# Patient Record
Sex: FEMALE | Race: WHITE | NOT HISPANIC OR LATINO | Employment: UNEMPLOYED | URBAN - METROPOLITAN AREA
[De-identification: names, ages, dates, MRNs, and addresses within clinical notes are randomized per-mention and may not be internally consistent; named-entity substitution may affect disease eponyms.]

---
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
## 2019-09-23 ENCOUNTER — OFFICE VISIT (OUTPATIENT)
Dept: PEDIATRICS CLINIC | Facility: CLINIC | Age: 19
End: 2019-09-23

## 2019-09-23 VITALS
HEART RATE: 62 BPM | SYSTOLIC BLOOD PRESSURE: 110 MMHG | DIASTOLIC BLOOD PRESSURE: 70 MMHG | BODY MASS INDEX: 23.18 KG/M2 | TEMPERATURE: 98.1 F | WEIGHT: 122.8 LBS | RESPIRATION RATE: 14 BRPM | HEIGHT: 61 IN

## 2019-09-23 DIAGNOSIS — Z00.129 ENCOUNTER FOR WELL ADOLESCENT VISIT: Primary | ICD-10-CM

## 2019-09-23 DIAGNOSIS — Z13.31 SCREENING FOR DEPRESSION: ICD-10-CM

## 2019-09-23 DIAGNOSIS — L20.84 INTRINSIC ECZEMA: ICD-10-CM

## 2019-09-23 DIAGNOSIS — Z01.00 ENCOUNTER FOR VISION SCREENING: ICD-10-CM

## 2019-09-23 DIAGNOSIS — Z71.3 NUTRITIONAL COUNSELING: ICD-10-CM

## 2019-09-23 DIAGNOSIS — Z01.10 ENCOUNTER FOR HEARING EXAMINATION WITHOUT ABNORMAL FINDINGS: ICD-10-CM

## 2019-09-23 DIAGNOSIS — Z71.82 EXERCISE COUNSELING: ICD-10-CM

## 2019-09-23 PROBLEM — J45.30 MILD PERSISTENT ASTHMA WITHOUT COMPLICATION: Status: ACTIVE | Noted: 2019-09-23

## 2019-09-23 PROCEDURE — 96127 BRIEF EMOTIONAL/BEHAV ASSMT: CPT | Performed by: PEDIATRICS

## 2019-09-23 PROCEDURE — 92552 PURE TONE AUDIOMETRY AIR: CPT | Performed by: PEDIATRICS

## 2019-09-23 PROCEDURE — 99173 VISUAL ACUITY SCREEN: CPT | Performed by: PEDIATRICS

## 2019-09-23 PROCEDURE — 99395 PREV VISIT EST AGE 18-39: CPT | Performed by: PEDIATRICS

## 2019-09-23 RX ORDER — ALBUTEROL SULFATE 2.5 MG/3ML
2.5 SOLUTION RESPIRATORY (INHALATION) EVERY 6 HOURS PRN
COMMUNITY
End: 2020-11-17

## 2019-09-23 RX ORDER — ALBUTEROL SULFATE 90 UG/1
2 AEROSOL, METERED RESPIRATORY (INHALATION) EVERY 6 HOURS PRN
COMMUNITY
End: 2022-02-01 | Stop reason: SDUPTHER

## 2019-09-23 NOTE — PROGRESS NOTES
Subjective:     Zach Mireles is a 20 y o  female who is brought in for this well child visit  History provided by: patient and mother    Current Issues:  Current concerns: none  regular periods, no issues    The following portions of the patient's history were reviewed and updated as appropriate: allergies, current medications, past family history, past medical history, past social history, past surgical history and problem list     Well Child Assessment:  History provided by: patient  Santos Mariee lives with her mother  Nutrition  Types of intake include cow's milk and eggs (eats fast food, sometimes [employer] frozen meals, veggie mac n cheese, cups of fruit  , protein drinks to help with weight)  Dental  The patient has a dental home  The patient brushes teeth regularly  The patient flosses regularly  Last dental exam was less than 6 months ago  Behavioral  Disciplinary methods include consistency among caregivers  Sleep  Average sleep duration is 6 hours  The patient does not snore  There are no sleep problems  Safety  There is no smoking in the home  Home has working smoke alarms? yes  Home has working carbon monoxide alarms? yes  School  Current school district is Looking for a job right now, no college attendance      Screening  There are no risk factors for hearing loss  There are no risk factors for anemia  There are no risk factors for dyslipidemia  There are no risk factors for tuberculosis  There are no risk factors for vision problems  There are no risk factors related to diet  There are no risk factors at school  There are no risk factors for sexually transmitted infections  There are no risk factors related to alcohol  There are no risk factors related to relationships  There are no risk factors related to friends or family  There are no risk factors related to emotions  There are no risk factors related to drugs  There are no risk factors related to personal safety   There are no risk factors related to tobacco  There are no risk factors related to special circumstances  Social  The caregiver enjoys the child  After school, the child is at home with a parent  Sibling interactions are good  Objective:       Vitals:    09/23/19 1414   BP: 110/70   BP Location: Left arm   Patient Position: Sitting   Cuff Size: Adult   Pulse: 62   Resp: 14   Temp: 98 1 °F (36 7 °C)   TempSrc: Tympanic   Weight: 55 7 kg (122 lb 12 8 oz)   Height: 5' 1" (1 549 m)     Growth parameters are noted and are appropriate for age  Wt Readings from Last 1 Encounters:   09/23/19 55 7 kg (122 lb 12 8 oz)     Ht Readings from Last 1 Encounters:   09/23/19 5' 1" (1 549 m)      Body mass index is 23 2 kg/m²  Vitals:    09/23/19 1414   BP: 110/70   BP Location: Left arm   Patient Position: Sitting   Cuff Size: Adult   Pulse: 62   Resp: 14   Temp: 98 1 °F (36 7 °C)   TempSrc: Tympanic   Weight: 55 7 kg (122 lb 12 8 oz)   Height: 5' 1" (1 549 m)        Hearing Screening    125Hz 250Hz 500Hz 1000Hz 2000Hz 3000Hz 4000Hz 6000Hz 8000Hz   Right ear:    20 20  20     Left ear:    20 20  20        Visual Acuity Screening    Right eye Left eye Both eyes   Without correction:      With correction: 20/20 20/20 20/20       Physical Exam   Constitutional: She is oriented to person, place, and time  She appears well-developed and well-nourished  She is cooperative  HENT:   Head: Normocephalic  Right Ear: Hearing, tympanic membrane, external ear and ear canal normal    Left Ear: Hearing, tympanic membrane, external ear and ear canal normal    Nose: Nose normal    Mouth/Throat: Oropharynx is clear and moist and mucous membranes are normal    Eyes: Pupils are equal, round, and reactive to light  Conjunctivae, EOM and lids are normal    Neck: Trachea normal and normal range of motion  Neck supple  Cardiovascular: Normal rate, regular rhythm, normal heart sounds and normal pulses  No murmur heard    Pulmonary/Chest: Effort normal and breath sounds normal  She has no wheezes  She has no rhonchi  She has no rales  Abdominal: Soft  Bowel sounds are normal  She exhibits no mass  There is no hepatosplenomegaly  There is no tenderness  Genitourinary: Vagina normal    Genitourinary Comments: Normal Female  exam, андрей stage 5   Musculoskeletal: Normal range of motion  Neurological: She is alert and oriented to person, place, and time  She has normal strength  Skin: Skin is warm and dry  Capillary refill takes less than 2 seconds  Some dry patches of skin on her neck, and flexors of her arms and legs   Psychiatric: She has a normal mood and affect  Her behavior is normal  Judgment and thought content normal    Nursing note and vitals reviewed  Assessment:     Well adolescent  1  Encounter for well adolescent visit  HPV VACCINE 9 VALENT IM   2  Encounter for hearing examination without abnormal findings     3  Encounter for vision screening     4  Screening for depression          Plan:         1  Anticipatory guidance discussed  Specific topics reviewed: breast self-exam, drugs, ETOH, and tobacco, importance of regular dental care, importance of regular exercise, importance of varied diet, limit TV, media violence, minimize junk food, seat belts and sex; STD and pregnancy prevention  Nutrition and Exercise Counseling: The patient's Body mass index is 23 2 kg/m²  This is Facility age limit for growth percentiles is 20 years  Nutrition counseling provided:  Anticipatory guidance for nutrition given and counseled on healthy eating habits, 5 servings of fruits/vegetables and Avoid juice/sugary drinks    Exercise counseling provided:  Anticipatory guidance and counseling on exercise and physical activity given, Reduce screen time to less than 2 hours per day and 1 hour of aerobic exercise daily      2  Depression screen performed:       Patient screened- Negative    3  Development: appropriate for age    3  Immunizations today: vaccines up to date  5  Follow-up visit in 1 year for next well child visit, or sooner as needed

## 2019-09-23 NOTE — PATIENT INSTRUCTIONS

## 2020-07-30 ENCOUNTER — TELEPHONE (OUTPATIENT)
Dept: PEDIATRICS CLINIC | Facility: CLINIC | Age: 20
End: 2020-07-30

## 2020-07-30 DIAGNOSIS — J45.20 MILD INTERMITTENT ASTHMA WITHOUT COMPLICATION: Primary | ICD-10-CM

## 2020-07-30 RX ORDER — FLUTICASONE PROPIONATE 44 UG/1
2 AEROSOL, METERED RESPIRATORY (INHALATION) 2 TIMES DAILY
Qty: 1 INHALER | Refills: 2 | Status: SHIPPED | OUTPATIENT
Start: 2020-07-30 | End: 2022-02-01 | Stop reason: SDUPTHER

## 2020-07-30 RX ORDER — ALBUTEROL SULFATE 90 UG/1
2 AEROSOL, METERED RESPIRATORY (INHALATION) EVERY 4 HOURS PRN
Status: DISCONTINUED | OUTPATIENT
Start: 2020-07-30 | End: 2022-02-01

## 2020-07-30 NOTE — TELEPHONE ENCOUNTER
Laura Luh : 89 needs a refill of her 2 inhalers  Albuterol HFA 90 and Flovent  Send to [facility] on [address] in [facility]

## 2020-08-05 ENCOUNTER — TELEPHONE (OUTPATIENT)
Dept: PEDIATRICS CLINIC | Facility: CLINIC | Age: 20
End: 2020-08-05

## 2020-08-05 NOTE — TELEPHONE ENCOUNTER
Lashawn Morfin is missing her Pro Air/Albuterol inhaler  She said that was not called in  Thank you

## 2020-08-05 NOTE — TELEPHONE ENCOUNTER
Mahamed Sahu : 89  Mom called and I told her you will not be in the office until tomorrow  You only sent over 1 inhaler and she needed 2  Please send to [address] [place] [address] [facility]   Mom's phone #  [phone]

## 2020-08-06 NOTE — TELEPHONE ENCOUNTER
I don't see a prescription for albuterol/flovent in the chart  Last asthma check  last May 2019  I called mother but could not leave message  Because box was full

## 2020-08-17 NOTE — TELEPHONE ENCOUNTER
08/17/20 1:29 PM     Thank you for your request  Your request has been received and reviewed  Refill request sent thru in error  Appt scheduled with new  PCP office; new  PCP office to update PCP field when patient arrives for appointment  This message will now be completed      Thank you  Isaías Boone

## 2020-09-14 ENCOUNTER — OFFICE VISIT (OUTPATIENT)
Dept: FAMILY MEDICINE CLINIC | Facility: HOSPITAL | Age: 20
End: 2020-09-14

## 2020-09-14 VITALS
HEIGHT: 62 IN | TEMPERATURE: 98.3 F | BODY MASS INDEX: 25.4 KG/M2 | DIASTOLIC BLOOD PRESSURE: 62 MMHG | SYSTOLIC BLOOD PRESSURE: 120 MMHG | HEART RATE: 81 BPM | WEIGHT: 138 LBS

## 2020-09-14 DIAGNOSIS — L20.84 INTRINSIC ECZEMA: ICD-10-CM

## 2020-09-14 DIAGNOSIS — J45.30 MILD PERSISTENT ASTHMA WITHOUT COMPLICATION: Primary | ICD-10-CM

## 2020-09-14 PROCEDURE — 99213 OFFICE O/P EST LOW 20 MIN: CPT | Performed by: FAMILY MEDICINE

## 2020-09-14 NOTE — PROGRESS NOTES
Assessment/Plan:         Diagnoses and all orders for this visit:    Mild persistent asthma without complication  Comments:  OK PRN ProAir    Intrinsic eczema  Comments:  OK Hydrocortisone PRN    BMI 25 0-25 9,adult          Subjective:      Patient ID: Sushil Blood is a 23 y o  female  Establish as new patient to us  Cared for by Dr Nadja Starr to present time  Allergies can flare her asthma  Symptoms of allergies respond to Benadryl  Eczema under control with Hydrocortisone  The following portions of the patient's history were reviewed and updated as appropriate: allergies, current medications, past family history, past medical history, past social history, past surgical history and problem list     Review of Systems   Constitutional: Negative for unexpected weight change  HENT: Negative  Respiratory: Negative  Cardiovascular: Negative  Gastrointestinal: Negative  Genitourinary: Negative  Musculoskeletal: Negative  Allergic/Immunologic: Positive for environmental allergies  Neurological: Negative  Hematological: Negative  Psychiatric/Behavioral: Negative  All other systems reviewed and are negative  Objective:      /62   Pulse 81   Temp 98 3 °F (36 8 °C)   Ht 5' 2 25" (1 581 m)   Wt 62 6 kg (138 lb)   BMI 25 04 kg/m²          Physical Exam  Vitals signs and nursing note reviewed  Constitutional:       Appearance: Normal appearance  She is normal weight  HENT:      Head: Normocephalic and atraumatic  Right Ear: Tympanic membrane, ear canal and external ear normal       Left Ear: Tympanic membrane, ear canal and external ear normal       Nose: Nose normal       Mouth/Throat:      Mouth: Mucous membranes are moist    Eyes:      Extraocular Movements: Extraocular movements intact  Conjunctiva/sclera: Conjunctivae normal       Pupils: Pupils are equal, round, and reactive to light     Neck:      Musculoskeletal: Normal range of motion and neck supple  Cardiovascular:      Rate and Rhythm: Normal rate and regular rhythm  Pulses: Normal pulses  Heart sounds: Normal heart sounds  Pulmonary:      Effort: Pulmonary effort is normal       Breath sounds: Normal breath sounds  Abdominal:      General: Abdomen is flat  Bowel sounds are normal       Palpations: Abdomen is soft  Musculoskeletal: Normal range of motion  Lymphadenopathy:      Cervical: No cervical adenopathy  Skin:     Findings: Rash present  Comments: Lichenification antecubital areas both elbows   Neurological:      General: No focal deficit present  Mental Status: She is alert and oriented to person, place, and time  Psychiatric:         Mood and Affect: Mood normal          Behavior: Behavior normal          Thought Content: Thought content normal          Judgment: Judgment normal        BMI Counseling: Body mass index is 25 04 kg/m²  The BMI is above normal  Nutrition recommendations include reducing portion sizes, decreasing overall calorie intake and moderation in carbohydrate intake  Exercise recommendations include exercising 3-5 times per week

## 2020-11-17 ENCOUNTER — TELEPHONE (OUTPATIENT)
Dept: FAMILY MEDICINE CLINIC | Facility: HOSPITAL | Age: 20
End: 2020-11-17

## 2020-11-17 ENCOUNTER — TELEMEDICINE (OUTPATIENT)
Dept: FAMILY MEDICINE CLINIC | Facility: HOSPITAL | Age: 20
End: 2020-11-17
Payer: COMMERCIAL

## 2020-11-17 VITALS — WEIGHT: 140 LBS | TEMPERATURE: 99.6 F | BODY MASS INDEX: 25.76 KG/M2 | HEIGHT: 62 IN

## 2020-11-17 DIAGNOSIS — J02.9 PHARYNGITIS, UNSPECIFIED ETIOLOGY: Primary | ICD-10-CM

## 2020-11-17 PROCEDURE — 99213 OFFICE O/P EST LOW 20 MIN: CPT | Performed by: NURSE PRACTITIONER

## 2020-11-17 RX ORDER — AMOXICILLIN 400 MG/5ML
POWDER, FOR SUSPENSION ORAL
Qty: 200 ML | Refills: 0 | Status: SHIPPED | OUTPATIENT
Start: 2020-11-17 | End: 2020-11-23

## 2020-11-23 ENCOUNTER — TELEPHONE (OUTPATIENT)
Dept: FAMILY MEDICINE CLINIC | Facility: HOSPITAL | Age: 20
End: 2020-11-23

## 2020-11-30 ENCOUNTER — TELEPHONE (OUTPATIENT)
Dept: FAMILY MEDICINE CLINIC | Facility: HOSPITAL | Age: 20
End: 2020-11-30

## 2020-11-30 DIAGNOSIS — B96.89 BACTERIAL PHARYNGITIS: Primary | ICD-10-CM

## 2020-11-30 DIAGNOSIS — J02.8 BACTERIAL PHARYNGITIS: Primary | ICD-10-CM

## 2020-11-30 RX ORDER — CEFDINIR 250 MG/5ML
7 POWDER, FOR SUSPENSION ORAL 2 TIMES DAILY
Qty: 124.6 ML | Refills: 0 | Status: SHIPPED | OUTPATIENT
Start: 2020-11-30 | End: 2020-12-07

## 2020-12-01 ENCOUNTER — TELEPHONE (OUTPATIENT)
Dept: FAMILY MEDICINE CLINIC | Facility: HOSPITAL | Age: 20
End: 2020-12-01

## 2021-02-12 ENCOUNTER — TELEMEDICINE (OUTPATIENT)
Dept: FAMILY MEDICINE CLINIC | Facility: HOSPITAL | Age: 21
End: 2021-02-12
Payer: COMMERCIAL

## 2021-02-12 ENCOUNTER — APPOINTMENT (OUTPATIENT)
Dept: LAB | Facility: HOSPITAL | Age: 21
End: 2021-02-12

## 2021-02-12 VITALS — HEIGHT: 62 IN | BODY MASS INDEX: 25.76 KG/M2 | TEMPERATURE: 98.9 F | WEIGHT: 140 LBS

## 2021-02-12 DIAGNOSIS — J03.90 EXUDATIVE TONSILLITIS: ICD-10-CM

## 2021-02-12 DIAGNOSIS — J03.90 EXUDATIVE TONSILLITIS: Primary | ICD-10-CM

## 2021-02-12 LAB
ALBUMIN SERPL BCP-MCNC: 4.1 G/DL (ref 3.5–5)
ALP SERPL-CCNC: 94 U/L (ref 46–116)
ALT SERPL W P-5'-P-CCNC: 38 U/L (ref 12–78)
ANION GAP SERPL CALCULATED.3IONS-SCNC: 10 MMOL/L (ref 4–13)
AST SERPL W P-5'-P-CCNC: 26 U/L (ref 5–45)
BASOPHILS # BLD AUTO: 0.05 THOUSANDS/ΜL (ref 0–0.1)
BASOPHILS NFR BLD AUTO: 1 % (ref 0–1)
BILIRUB SERPL-MCNC: 0.3 MG/DL (ref 0.2–1)
BUN SERPL-MCNC: 6 MG/DL (ref 5–25)
CALCIUM SERPL-MCNC: 9 MG/DL (ref 8.3–10.1)
CHLORIDE SERPL-SCNC: 104 MMOL/L (ref 100–108)
CO2 SERPL-SCNC: 26 MMOL/L (ref 21–32)
CREAT SERPL-MCNC: 0.63 MG/DL (ref 0.6–1.3)
EOSINOPHIL # BLD AUTO: 0.3 THOUSAND/ΜL (ref 0–0.61)
EOSINOPHIL NFR BLD AUTO: 3 % (ref 0–6)
ERYTHROCYTE [DISTWIDTH] IN BLOOD BY AUTOMATED COUNT: 12.7 % (ref 11.6–15.1)
ERYTHROCYTE [SEDIMENTATION RATE] IN BLOOD: 33 MM/HOUR (ref 0–19)
GFR SERPL CREATININE-BSD FRML MDRD: 129 ML/MIN/1.73SQ M
GLUCOSE SERPL-MCNC: 83 MG/DL (ref 65–140)
HCT VFR BLD AUTO: 45.3 % (ref 34.8–46.1)
HETEROPH AB SER QL: NEGATIVE
HGB BLD-MCNC: 15 G/DL (ref 11.5–15.4)
IMM GRANULOCYTES # BLD AUTO: 0.06 THOUSAND/UL (ref 0–0.2)
IMM GRANULOCYTES NFR BLD AUTO: 1 % (ref 0–2)
LYMPHOCYTES # BLD AUTO: 2.19 THOUSANDS/ΜL (ref 0.6–4.47)
LYMPHOCYTES NFR BLD AUTO: 21 % (ref 14–44)
MCH RBC QN AUTO: 29.8 PG (ref 26.8–34.3)
MCHC RBC AUTO-ENTMCNC: 33.1 G/DL (ref 31.4–37.4)
MCV RBC AUTO: 90 FL (ref 82–98)
MONOCYTES # BLD AUTO: 0.88 THOUSAND/ΜL (ref 0.17–1.22)
MONOCYTES NFR BLD AUTO: 9 % (ref 4–12)
NEUTROPHILS # BLD AUTO: 6.91 THOUSANDS/ΜL (ref 1.85–7.62)
NEUTS SEG NFR BLD AUTO: 65 % (ref 43–75)
NRBC BLD AUTO-RTO: 0 /100 WBCS
PLATELET # BLD AUTO: 321 THOUSANDS/UL (ref 149–390)
PMV BLD AUTO: 9.7 FL (ref 8.9–12.7)
POTASSIUM SERPL-SCNC: 3.8 MMOL/L (ref 3.5–5.3)
PROT SERPL-MCNC: 8.3 G/DL (ref 6.4–8.2)
RBC # BLD AUTO: 5.03 MILLION/UL (ref 3.81–5.12)
SODIUM SERPL-SCNC: 140 MMOL/L (ref 136–145)
WBC # BLD AUTO: 10.39 THOUSAND/UL (ref 4.31–10.16)

## 2021-02-12 PROCEDURE — 36415 COLL VENOUS BLD VENIPUNCTURE: CPT

## 2021-02-12 PROCEDURE — 85652 RBC SED RATE AUTOMATED: CPT

## 2021-02-12 PROCEDURE — 85025 COMPLETE CBC W/AUTO DIFF WBC: CPT

## 2021-02-12 PROCEDURE — 86308 HETEROPHILE ANTIBODY SCREEN: CPT

## 2021-02-12 PROCEDURE — 80053 COMPREHEN METABOLIC PANEL: CPT

## 2021-02-12 PROCEDURE — 99213 OFFICE O/P EST LOW 20 MIN: CPT | Performed by: NURSE PRACTITIONER

## 2021-02-12 RX ORDER — AMOXICILLIN AND CLAVULANATE POTASSIUM 400; 57 MG/5ML; MG/5ML
POWDER, FOR SUSPENSION ORAL
Qty: 200 ML | Refills: 0 | Status: SHIPPED | OUTPATIENT
Start: 2021-02-12 | End: 2021-02-21

## 2021-02-12 NOTE — PROGRESS NOTES
COVID-19 Virtual Visit     Assessment/Plan:    Problem List Items Addressed This Visit     None      Visit Diagnoses     Exudative tonsillitis    -  Primary    Relevant Orders    CBC and differential (Completed)    Comprehensive metabolic panel (Completed)    Mononucleosis screen    Sedimentation rate, automated (Completed)         Disposition:     After clarifying the patient's history, my suspicion for COVID-19 infection is very low  Suspect mono vs strep as more likely cause for symptoms  STAT labs ordered to be completed today to r/o mono  Will determine further plan pending results  Rest, push fluids and diet as tolerated, and use OTC meds prn  I have spent 10 minutes directly with the patient  Greater than 50% of this time was spent in counseling/coordination of care regarding: risks and benefits of treatment options, instructions for management, patient and family education and impressions  Encounter provider DARIEL Dunbar    Provider located at [address] [place] [address]  [address] [place] [address]    Recent Visits  No visits were found meeting these conditions  Showing recent visits within past 7 days and meeting all other requirements     Today's Visits  Date Type Provider Dept   02/12/21 Telemedicine DARIEL Dunbar  [facility]   Showing today's visits and meeting all other requirements     Future Appointments  No visits were found meeting these conditions  Showing future appointments within next 150 days and meeting all other requirements      This virtual check-in was done via [employer] and patient was informed that this is not a secure, HIPAA-compliant platform  She agrees to proceed  Patient agrees to participate in a virtual check in via telephone or video visit instead of presenting to the office to address urgent/immediate medical needs  Patient is aware this is a billable service      After connecting through Tele[employer], the patient was identified by name and date of birth  Anthony Rosas was informed that this was a telemedicine visit and that the exam was being conducted confidentially over secure lines  My office door was closed  No one else was in the room  Anthony Rosas acknowledged consent and understanding of privacy and security of the telemedicine visit  I informed the patient that I have reviewed her record in Epic and presented the opportunity for her to ask any questions regarding the visit today  The patient agreed to participate  Subjective:   Anthony Rosas is a 23 y o  female who is concerned about COVID-19  Patient's symptoms include fatigue (for over a week per mom), nasal congestion, sore throat (worse yesterday, feels swollen, has white spots), nausea, diarrhea (5 nights ago) and headache (this week)  Patient denies fever, chills, rhinorrhea, anosmia, loss of taste, cough, shortness of breath, chest tightness, abdominal pain and vomiting  Date of symptom onset: 2/8/2021    Exposure:   Contact with a person who is under investigation (PUI) for or who is positive for COVID-19 within the last 14 days?: No    Hospitalized recently for fever and/or lower respiratory symptoms?: No      Currently a healthcare worker that is involved in direct patient care?: No      Works in a special setting where the risk of COVID-19 transmission may be high? (this may include long-term care, correctional and penitentiary facilities; homeless shelters; assisted-living facilities and group homes ): No      Resident in a special setting where the risk of COVID-19 transmission may be high? (this may include long-term care, correctional and penitentiary facilities; homeless shelters; assisted-living facilities and group homes ): No      Started w/upset stomach, felt tired and had headache 5 days ago  No meds taken  Has been out of work since Tuesday  Denies known covid or mono contacts   Mom states she was recently on 2 rounds of antibiotics for strep but still has sore throat and white spots  No results found for: Jalen Johns, SARSCORONAVI, [address]  Past Medical History:   Diagnosis Date    Allergic rhinitis     Asthma     Eczema      History reviewed  No pertinent surgical history  Current Outpatient Medications   Medication Sig Dispense Refill    albuterol (PROVENTIL HFA,VENTOLIN HFA) 90 mcg/act inhaler Inhale 2 puffs every 6 (six) hours as needed for wheezing      fluticasone (Flovent HFA) 44 mcg/act inhaler Inhale 2 puffs 2 (two) times a day Rinse mouth after use  1 Inhaler 2     Current Facility-Administered Medications   Medication Dose Route Frequency Provider Last Rate Last Admin    albuterol (PROVENTIL HFA,VENTOLIN HFA) inhaler 2 puff  2 puff Inhalation Q4H PRN Kemi Jeffrey MD         No Known Allergies    Review of Systems   Constitutional: Positive for appetite change (last week eating/drinking less, better this week) and fatigue (for over a week per mom)  Negative for chills and fever  HENT: Positive for congestion and sore throat (worse yesterday, feels swollen, has white spots)  Negative for rhinorrhea  Respiratory: Negative for cough, chest tightness and shortness of breath  Gastrointestinal: Positive for diarrhea (5 nights ago) and nausea  Negative for abdominal pain and vomiting  Neurological: Positive for headaches (this week)  Psychiatric/Behavioral: The patient is nervous/anxious (pt states stress has been bothering her)  Objective:    Vitals:    02/12/21 0949   Temp: 98 9 °F (37 2 °C)   Weight: 63 5 kg (140 lb)   Height: 5' 2 25" (1 581 m)       Physical Exam  Vitals signs reviewed  Constitutional:       General: She is not in acute distress  Appearance: She is not ill-appearing  HENT:      Head: Normocephalic and atraumatic  Mouth/Throat: Tonsils: Tonsillar exudate present  1+ on the right  1+ on the left     Pulmonary:      Effort: Pulmonary effort is normal  No respiratory distress  Comments: No cough  Neurological:      General: No focal deficit present  Mental Status: She is alert and oriented to person, place, and time  Psychiatric:         Mood and Affect: Mood normal          Behavior: Behavior normal        VIRTUAL VISIT DISCLAIMER    Alea Benson acknowledges that she has consented to an online visit or consultation  She understands that the online visit is based solely on information provided by her, and that, in the absence of a face-to-face physical evaluation by the physician, the diagnosis she receives is both limited and provisional in terms of accuracy and completeness  This is not intended to replace a full medical face-to-face evaluation by the physician  Alea Benson understands and accepts these terms

## 2021-09-21 ENCOUNTER — TELEMEDICINE (OUTPATIENT)
Dept: FAMILY MEDICINE CLINIC | Facility: HOSPITAL | Age: 21
End: 2021-09-21
Payer: COMMERCIAL

## 2021-09-21 ENCOUNTER — TELEPHONE (OUTPATIENT)
Dept: FAMILY MEDICINE CLINIC | Facility: HOSPITAL | Age: 21
End: 2021-09-21

## 2021-09-21 VITALS — TEMPERATURE: 99.4 F | BODY MASS INDEX: 25.76 KG/M2 | HEIGHT: 62 IN | WEIGHT: 140 LBS

## 2021-09-21 DIAGNOSIS — J02.9 PHARYNGITIS, UNSPECIFIED ETIOLOGY: Primary | ICD-10-CM

## 2021-09-21 LAB — S PYO AG THROAT QL: NEGATIVE

## 2021-09-21 PROCEDURE — 87880 STREP A ASSAY W/OPTIC: CPT | Performed by: NURSE PRACTITIONER

## 2021-09-21 PROCEDURE — 99213 OFFICE O/P EST LOW 20 MIN: CPT | Performed by: NURSE PRACTITIONER

## 2021-09-21 NOTE — PROGRESS NOTES
Virtual Regular Visit    Verification of patient location:    Patient is located in the following state in which I hold an active license [place]      Assessment/Plan:    Problem List Items Addressed This Visit     None      Visit Diagnoses     Pharyngitis, unspecified etiology    -  Primary    Exam was normal  I suspect early viral URI  Low suspicion for COVID and day 2 of symtpoms so defer swab  Symptomatic support  Send throat culture  Relevant Orders    Throat culture    POCT rapid strepA               Reason for visit is   Chief Complaint   Patient presents with    Sore Throat    Nasal Congestion    Virtual Regular Visit        Encounter provider Mayuri Cuevas     Provider located at [address]  [facility] [address]      Recent Visits  No visits were found meeting these conditions  Showing recent visits within past 7 days and meeting all other requirements  Today's Visits  Date Type Provider Dept   09/21/21 Telemedicine DARIEL Cuevas [facility] [address]   Showing today's visits and meeting all other requirements  Future Appointments  No visits were found meeting these conditions  Showing future appointments within next 150 days and meeting all other requirements       The patient was identified by name and date of birth  Jaron Ospina was informed that this is a telemedicine visit and that the visit is being conducted through [address] [place] [address] and patient was informed that this is a secure, HIPAA-compliant platform  She agrees to proceed     My office door was closed  No one else was in the room  She acknowledged consent and understanding of privacy and security of the video platform  The patient has agreed to participate and understands they can discontinue the visit at any time  Patient is aware this is a billable service       Subjective  Jaron Ospina is a 24 y o  female Rut Sanches woke with sore throat  Tonsils are red with white spots  First on right side and now on left side  No fever or chills  Has mild nasal congestion which just started  No N/V/D  No HA or body aches  Has taste and smell  No cough, sob or chest tightness  No vaccinated against COVID  No COVID contacts  Sore Throat   Associated symptoms include congestion  Pertinent negatives include no abdominal pain, coughing, diarrhea, headaches, shortness of breath or vomiting  Past Medical History:   Diagnosis Date    Allergic rhinitis     Asthma     Eczema        History reviewed  No pertinent surgical history  Current Outpatient Medications   Medication Sig Dispense Refill    albuterol (PROVENTIL HFA,VENTOLIN HFA) 90 mcg/act inhaler Inhale 2 puffs every 6 (six) hours as needed for wheezing      fluticasone (Flovent HFA) 44 mcg/act inhaler Inhale 2 puffs 2 (two) times a day Rinse mouth after use  1 Inhaler 2     Current Facility-Administered Medications   Medication Dose Route Frequency Provider Last Rate Last Admin    albuterol (PROVENTIL HFA,VENTOLIN HFA) inhaler 2 puff  2 puff Inhalation Q4H PRN Miguel Rawls MD            No Known Allergies    Review of Systems   Constitutional: Negative for chills, fatigue and fever  HENT: Positive for congestion and sore throat  Respiratory: Negative for cough, chest tightness and shortness of breath  Gastrointestinal: Negative for abdominal pain, diarrhea, nausea and vomiting  Musculoskeletal: Negative for myalgias  Neurological: Negative for headaches  Video Exam    Vitals:    09/21/21 1417   Temp: 99 4 °F (37 4 °C)   Weight: 63 5 kg (140 lb)   Height: 5' 2" (1 575 m)       Physical Exam  Vitals reviewed  Constitutional:       General: She is not in acute distress  Appearance: She is well-developed  She is not ill-appearing  HENT:      Mouth/Throat:      Mouth: Mucous membranes are moist       Pharynx: Oropharynx is clear   No oropharyngeal exudate or posterior oropharyngeal erythema  Tonsils: No tonsillar exudate  Skin:     General: Skin is warm and dry  Neurological:      Mental Status: She is alert and oriented to person, place, and time  Psychiatric:         Mood and Affect: Mood normal          Behavior: Behavior normal       Exam and throat swab was done via curbside  I spent 15 minutes directly with the patient during this visit    VIRTUAL VISIT [id number] verbally agrees to participate in [employer]  Pt is aware that [employer] could be limited without vital signs or the ability to perform a full hands-on physical exam  Estrella Cruz understands she or the provider may request at any time to terminate the video visit and request the patient to seek care or treatment in person

## 2021-09-21 NOTE — LETTER
September 21, 2021     Patient: Libia Gmóez   YOB: 2000   Date of Visit: 9/21/2021       To Whom it May Concern:    Delfinaalisia Emily is under my professional care  She was seen in my office on 9/21/2021  She may return to work on 9/22/21  If you have any questions or concerns, please don't hesitate to call           Sincerely,          DARIEL Rutledge        CC: No Recipients

## 2021-09-21 NOTE — TELEPHONE ENCOUNTER
Has strep symptoms, white spots, red tonsils sore throat, prone to strep - not sure if she can come in ----?virtual or in person  Call her please with appt

## 2021-09-23 LAB — B-HEM STREP SPEC QL CULT: NEGATIVE

## 2022-02-01 ENCOUNTER — OFFICE VISIT (OUTPATIENT)
Dept: FAMILY MEDICINE CLINIC | Facility: HOSPITAL | Age: 22
End: 2022-02-01

## 2022-02-01 VITALS
WEIGHT: 140.2 LBS | HEIGHT: 62 IN | HEART RATE: 91 BPM | DIASTOLIC BLOOD PRESSURE: 70 MMHG | OXYGEN SATURATION: 99 % | TEMPERATURE: 98.4 F | SYSTOLIC BLOOD PRESSURE: 120 MMHG | BODY MASS INDEX: 25.8 KG/M2

## 2022-02-01 DIAGNOSIS — Z00.00 ANNUAL PHYSICAL EXAM: Primary | ICD-10-CM

## 2022-02-01 DIAGNOSIS — Z97.3 WEARS GLASSES: ICD-10-CM

## 2022-02-01 DIAGNOSIS — J45.20 MILD INTERMITTENT ASTHMA WITHOUT COMPLICATION: ICD-10-CM

## 2022-02-01 DIAGNOSIS — Z02.4 ENCOUNTER FOR EXAMINATION FOR DRIVING LICENSE: ICD-10-CM

## 2022-02-01 DIAGNOSIS — L20.84 INTRINSIC ECZEMA: ICD-10-CM

## 2022-02-01 PROCEDURE — 99395 PREV VISIT EST AGE 18-39: CPT | Performed by: FAMILY MEDICINE

## 2022-02-01 RX ORDER — FLUTICASONE PROPIONATE 44 MCG
2 AEROSOL WITH ADAPTER (GRAM) INHALATION 2 TIMES DAILY
Qty: 10.6 G | Refills: 2 | Status: SHIPPED | OUTPATIENT
Start: 2022-02-01 | End: 2023-02-01

## 2022-02-01 RX ORDER — ALBUTEROL SULFATE 90 UG/1
2 AEROSOL, METERED RESPIRATORY (INHALATION) EVERY 6 HOURS PRN
Qty: 18 G | Refills: 2 | Status: SHIPPED | OUTPATIENT
Start: 2022-02-01

## 2022-02-01 NOTE — PROGRESS NOTES
ADULT ANNUAL [address] [facility] [address]     NAME: Cathy Whitaker  AGE: 24 y o  SEX: female  : 2000     DATE: 2022     Assessment and Plan:     Problem List Items Addressed This Visit        Respiratory    Mild intermittent asthma without complication    Relevant Medications    fluticasone (Flovent HFA) 44 mcg/act inhaler    albuterol (PROVENTIL HFA,VENTOLIN HFA) 90 mcg/act inhaler       Musculoskeletal and Integument    Intrinsic eczema       Other    BMI 25 0-25 9,adult    Annual physical exam - Primary    Encounter for examination for driving license    Wears glasses          Immunizations and preventive care screenings were discussed with patient today  Appropriate education was printed on patient's after visit summary  Counseling:  Alcohol/drug use: discussed moderation in alcohol intake, the recommendations for healthy alcohol use, and avoidance of illicit drug use  Dental Health: discussed importance of regular tooth brushing, flossing, and dental visits  Injury prevention: discussed safety/seat belts, safety helmets, smoke detectors, carbon dioxide detectors, and smoking near bedding or upholstery  · Exercise: the importance of regular exercise/physical activity was discussed  Recommend exercise 3-5 times per week for at least 30 minutes  No follow-ups on file  Chief Complaint:     Chief Complaint   Patient presents with    Physical Exam      History of Present Illness:     Adult Annual Physical   Patient here for a comprehensive physical exam  The patient reports no problems  Diet and Physical Activity  · Diet/Nutrition: well balanced diet  · Exercise: walking        Depression Screening  PHQ-2/9 Depression Screening    Little interest or pleasure in doing things: 0 - not at all  Feeling down, depressed, or hopeless: 0 - not at all  PHQ-2 Score: 0  PHQ-2 Interpretation: Negative depression screen General Health  · Sleep: sleeps well  · Hearing: normal - bilateral   · Vision: wears glasses  · Dental: regular dental visits and brushes teeth twice daily  /GYN Health  · Last menstrual period: -  · Contraceptive method: -   · History of STDs?: no      Review of Systems:     Review of Systems   Past Medical History:     Past Medical History:   Diagnosis Date    Allergic rhinitis     Asthma     Eczema       Past Surgical History:     History reviewed  No pertinent surgical history  Social History:     Social History     Socioeconomic History    Marital status: Single     Spouse name: None    Number of children: None    Years of education: None    Highest education level: None   Occupational History    None   Tobacco Use    Smoking status: Never Smoker    Smokeless tobacco: Never Used   Vaping Use    Vaping Use: Never used   Substance and Sexual Activity    Alcohol use: No    Drug use: No    Sexual activity: None   Other Topics Concern    None   Social History Narrative    None     Social Determinants of Health     Financial Resource Strain: Not on file   Food Insecurity: Not on file   Transportation Needs: Not on file   Physical Activity: Not on file   Stress: Not on file   Social Connections: Not on file   Intimate Partner Violence: Not on file   Housing Stability: Not on file      Family History:     Family History   Problem Relation Age of Onset    Cancer Paternal Grandfather       Current Medications:     Current Outpatient Medications   Medication Sig Dispense Refill    albuterol (PROVENTIL HFA,VENTOLIN HFA) 90 mcg/act inhaler Inhale 2 puffs every 6 (six) hours as needed for wheezing 18 g 2    fluticasone (Flovent HFA) 44 mcg/act inhaler Inhale 2 puffs 2 (two) times a day Rinse mouth after use  10 6 g 2     No current facility-administered medications for this visit        Allergies:     No Known Allergies   Physical Exam:     /70 (BP Location: Left arm, Patient Position: Sitting, Cuff Size: Standard)   Pulse 91   Temp 98 4 °F (36 9 °C) (Tympanic)   Ht 5' 2" (1 575 m)   Wt 63 6 kg (140 lb 3 2 oz)   SpO2 99%   BMI 25 64 kg/m²     Physical Exam  Vitals and nursing note reviewed  Constitutional:       Appearance: Normal appearance  HENT:      Head: Normocephalic and atraumatic  Right Ear: Tympanic membrane, ear canal and external ear normal       Left Ear: Tympanic membrane, ear canal and external ear normal       Nose: Nose normal       Mouth/Throat:      Mouth: Mucous membranes are moist    Eyes:      Extraocular Movements: Extraocular movements intact  Pupils: Pupils are equal, round, and reactive to light  Neck:      Vascular: No carotid bruit  Cardiovascular:      Rate and Rhythm: Normal rate and regular rhythm  Pulses: Normal pulses  Heart sounds: Normal heart sounds  Pulmonary:      Effort: Pulmonary effort is normal       Breath sounds: Normal breath sounds  Abdominal:      General: Abdomen is flat  Bowel sounds are normal       Palpations: Abdomen is soft  Musculoskeletal:      Right lower leg: No edema  Left lower leg: No edema  Lymphadenopathy:      Cervical: No cervical adenopathy  Skin:     Findings: Rash present  Neurological:      General: No focal deficit present  Mental Status: She is alert and oriented to person, place, and time     Psychiatric:         Mood and Affect: Mood normal           Anand Barahona MD   [id number]

## 2022-02-01 NOTE — PATIENT INSTRUCTIONS

## 2022-04-22 ENCOUNTER — OFFICE VISIT (OUTPATIENT)
Dept: FAMILY MEDICINE CLINIC | Facility: HOSPITAL | Age: 22
End: 2022-04-22

## 2022-04-22 VITALS
WEIGHT: 137.6 LBS | TEMPERATURE: 98.5 F | OXYGEN SATURATION: 100 % | SYSTOLIC BLOOD PRESSURE: 120 MMHG | HEART RATE: 90 BPM | BODY MASS INDEX: 25.32 KG/M2 | DIASTOLIC BLOOD PRESSURE: 70 MMHG | HEIGHT: 62 IN

## 2022-04-22 DIAGNOSIS — F41.1 GENERALIZED ANXIETY DISORDER: ICD-10-CM

## 2022-04-22 DIAGNOSIS — J45.30 MILD PERSISTENT ASTHMA WITHOUT COMPLICATION: Primary | ICD-10-CM

## 2022-04-22 PROCEDURE — 99213 OFFICE O/P EST LOW 20 MIN: CPT | Performed by: FAMILY MEDICINE

## 2022-04-22 RX ORDER — ESCITALOPRAM OXALATE 5 MG/1
5 TABLET ORAL DAILY
Qty: 30 TABLET | Refills: 2 | Status: SHIPPED | OUTPATIENT
Start: 2022-04-22 | End: 2022-07-12 | Stop reason: SINTOL

## 2022-04-22 NOTE — PROGRESS NOTES
Assessment/Plan:         Diagnoses and all orders for this visit:    Mild persistent asthma without complication    Generalized anxiety disorder  -     escitalopram (LEXAPRO) 5 mg tablet; Take 1 tablet (5 mg total) by mouth daily          Subjective:      Patient ID: Zurdo Jimenez is a 24 y o  female  Visit for asthma and anxiety    Started wheezing early yesterday and needed to use inhaler, helped after a few inhalations  We reviewed the purpose and use of her two inhalers- use Flovent daily for the next month and use Albuterol PRN rescue need    Struggling with anxiety- had therapy a few years ago  Never used medications  Has episodes of heart pounding and anxiety flares  Has some relaxation techniques she can apply    She is ready and interested in starting a medication  Advised daily med rather than PRN because of unpredictability of her anxiety      The following portions of the patient's history were reviewed and updated as appropriate: allergies, current medications, past family history, past medical history, past social history, past surgical history and problem list     Review of Systems   Constitutional: Negative for unexpected weight change  Respiratory: Positive for cough, shortness of breath and wheezing  Psychiatric/Behavioral: Negative for agitation, behavioral problems and decreased concentration  The patient is nervous/anxious  All other systems reviewed and are negative  Objective:      /70 (BP Location: Left arm, Patient Position: Sitting, Cuff Size: Standard)   Pulse 90   Temp 98 5 °F (36 9 °C) (Tympanic)   Ht 5' 2" (1 575 m)   Wt 62 4 kg (137 lb 9 6 oz)   SpO2 100%   BMI 25 17 kg/m²          Physical Exam  Vitals and nursing note reviewed  Cardiovascular:      Rate and Rhythm: Normal rate and regular rhythm  Pulses: Normal pulses  Heart sounds: Normal heart sounds     Pulmonary:      Effort: Pulmonary effort is normal       Breath sounds: Normal breath sounds  Neurological:      General: No focal deficit present  Mental Status: She is alert and oriented to person, place, and time     Psychiatric:         Mood and Affect: Mood normal

## 2022-07-06 ENCOUNTER — TELEPHONE (OUTPATIENT)
Dept: FAMILY MEDICINE CLINIC | Facility: HOSPITAL | Age: 22
End: 2022-07-06

## 2022-07-06 NOTE — TELEPHONE ENCOUNTER
It is possible that it is from Lexapro  Have her reduce to 1 tablet every other day for one week, then stop it  I wouldn't start a new medication until off of Lexapro for 2 weeks or so  Does she want to make an appt?

## 2022-07-06 NOTE — TELEPHONE ENCOUNTER
Pt states at last visit she was put on Lexapro, for about a month she has been having manic episodes, and last week had feelings of hurting herself  States she does not currently have these feelings, but would like to discuss this with someone as fas as possibly being the medications that is causing it  Only had this though once, but feels it is due to the Lexapro   PCB

## 2022-07-12 ENCOUNTER — OFFICE VISIT (OUTPATIENT)
Dept: FAMILY MEDICINE CLINIC | Facility: HOSPITAL | Age: 22
End: 2022-07-12
Payer: COMMERCIAL

## 2022-07-12 VITALS
TEMPERATURE: 98.5 F | DIASTOLIC BLOOD PRESSURE: 63 MMHG | OXYGEN SATURATION: 99 % | HEIGHT: 62 IN | HEART RATE: 79 BPM | WEIGHT: 139.4 LBS | SYSTOLIC BLOOD PRESSURE: 109 MMHG | BODY MASS INDEX: 25.65 KG/M2

## 2022-07-12 DIAGNOSIS — J45.30 MILD PERSISTENT ASTHMA WITHOUT COMPLICATION: ICD-10-CM

## 2022-07-12 DIAGNOSIS — F39 MOOD DISORDER (HCC): Primary | ICD-10-CM

## 2022-07-12 PROCEDURE — 99213 OFFICE O/P EST LOW 20 MIN: CPT | Performed by: FAMILY MEDICINE

## 2022-07-12 RX ORDER — MIRTAZAPINE 7.5 MG/1
7.5 TABLET, FILM COATED ORAL
Qty: 30 TABLET | Refills: 5 | Status: SHIPPED | OUTPATIENT
Start: 2022-07-12

## 2022-07-12 NOTE — PROGRESS NOTES
Assessment/Plan:         Diagnoses and all orders for this visit:    Mood disorder ([facility] [address] )  -     mirtazapine (REMERON) 7 5 MG tablet; Take 1 tablet (7 5 mg total) by mouth daily at bedtime    Mild persistent asthma without complication          Subjective:      Patient ID: Fannie Muñiz is a 24 y o  female  Discussion on anxiety medication    Started Escitalopram last visit    Asthma is quiescent, not needing at this point    Medication caused some fatigue initially and evened out her anxiety but then caused some manic- like overactivity and overtalkative  Had one day of thoughts of hurting herself  Lasted just one day  Was taking it every other day per our instructions to discontinue  No palpitations    Has history of mood swings and right now isnt as anxious      The following portions of the patient's history were reviewed and updated as appropriate: allergies, current medications, past family history, past medical history, past social history, past surgical history and problem list     Review of Systems   Constitutional: Negative for unexpected weight change  Genitourinary: Negative  Hematological: Negative  Psychiatric/Behavioral: Positive for decreased concentration and dysphoric mood  The patient is nervous/anxious  All other systems reviewed and are negative  Objective:      /63 (BP Location: Left arm, Patient Position: Sitting, Cuff Size: Standard)   Pulse 79   Temp 98 5 °F (36 9 °C) (Tympanic)   Ht 5' 2" (1 575 m)   Wt 63 2 kg (139 lb 6 4 oz)   SpO2 99%   BMI 25 50 kg/m²          Physical Exam  Vitals reviewed  Cardiovascular:      Rate and Rhythm: Normal rate and regular rhythm  Pulses: Normal pulses  Heart sounds: Normal heart sounds  Neurological:      General: No focal deficit present  Mental Status: She is oriented to person, place, and time     Psychiatric:         Mood and Affect: Mood normal          Behavior: Behavior normal  Thought Content:  Thought content normal

## 2022-08-16 ENCOUNTER — OFFICE VISIT (OUTPATIENT)
Dept: FAMILY MEDICINE CLINIC | Facility: HOSPITAL | Age: 22
End: 2022-08-16
Payer: COMMERCIAL

## 2022-08-16 VITALS
OXYGEN SATURATION: 100 % | SYSTOLIC BLOOD PRESSURE: 110 MMHG | DIASTOLIC BLOOD PRESSURE: 70 MMHG | HEIGHT: 62 IN | WEIGHT: 140 LBS | TEMPERATURE: 97.9 F | BODY MASS INDEX: 25.76 KG/M2 | HEART RATE: 89 BPM

## 2022-08-16 DIAGNOSIS — F39 MOOD DISORDER (HCC): Primary | ICD-10-CM

## 2022-08-16 PROCEDURE — 99213 OFFICE O/P EST LOW 20 MIN: CPT | Performed by: FAMILY MEDICINE

## 2022-08-16 NOTE — PROGRESS NOTES
Assessment/Plan:         Diagnoses and all orders for this visit:    Mood disorder ([facility] [address] )  Comments:  Good start with Mirtazapine, OK to increase to 2 hs premenstrually for 4-5 days          Subjective:      Patient ID: Gladys Sorto is a 20 y o  female  1 month follow up  Taking Mirtazapine for past month as switch from Escitalopram which caused some manic-like thoughts and behavior  Tolerating med adequately  Not as manic, more happy overall  Had a sad week last week, no real reason    Sleeping well, better    Work in general is OK    No side effects    Does have a counselor and would be willing to restart       The following portions of the patient's history were reviewed and updated as appropriate: allergies, current medications, past family history, past medical history, past social history, past surgical history and problem list     Review of Systems   Constitutional: Negative for unexpected weight change  Respiratory: Negative  Cardiovascular: Negative  Psychiatric/Behavioral: Positive for dysphoric mood  The patient is nervous/anxious  All other systems reviewed and are negative  Objective:      /70 (BP Location: Left arm, Patient Position: Sitting, Cuff Size: Standard)   Pulse 89   Temp 97 9 °F (36 6 °C) (Tympanic)   Ht 5' 2" (1 575 m)   Wt 63 5 kg (140 lb)   SpO2 100%   BMI 25 61 kg/m²          Physical Exam  Psychiatric:         Mood and Affect: Mood normal          Behavior: Behavior normal          Thought Content:  Thought content normal          Judgment: Judgment normal

## 2022-08-29 ENCOUNTER — TELEPHONE (OUTPATIENT)
Dept: FAMILY MEDICINE CLINIC | Facility: HOSPITAL | Age: 22
End: 2022-08-29

## 2022-08-29 DIAGNOSIS — J45.20 MILD INTERMITTENT ASTHMA WITHOUT COMPLICATION: ICD-10-CM

## 2022-08-29 RX ORDER — ALBUTEROL SULFATE 90 UG/1
2 AEROSOL, METERED RESPIRATORY (INHALATION) EVERY 6 HOURS PRN
Qty: 18 G | Refills: 2 | Status: SHIPPED | OUTPATIENT
Start: 2022-08-29

## 2022-08-29 RX ORDER — ALBUTEROL SULFATE 90 UG/1
2 AEROSOL, METERED RESPIRATORY (INHALATION) EVERY 6 HOURS PRN
Qty: 18 G | Refills: 2 | Status: CANCELLED | OUTPATIENT
Start: 2022-08-29

## 2022-08-29 NOTE — TELEPHONE ENCOUNTER
Pt tested Covid positive on Saturday  Symptoms began Tuesday with diarrhea and fatigue  Started with fever, headache and chills on Thursday, temp 99 8  Pt taking OTC Robitussin, Motrin and Tylenol  Also using rescue inhaler due to asthma   PCB

## 2022-08-29 NOTE — TELEPHONE ENCOUNTER
She is using all the proper medications for symptom control   Quarantine for 5 days from Saturday and mask for another 5 days beyond that    She wouldn't be a candidate for Paxlovid

## 2022-09-16 ENCOUNTER — TELEPHONE (OUTPATIENT)
Dept: FAMILY MEDICINE CLINIC | Facility: HOSPITAL | Age: 22
End: 2022-09-16

## 2022-09-16 DIAGNOSIS — F31.32 BIPOLAR AFFECTIVE DISORDER, CURRENTLY DEPRESSED, MODERATE (HCC): Primary | ICD-10-CM

## 2022-09-16 RX ORDER — OLANZAPINE 2.5 MG/1
2.5 TABLET ORAL
Qty: 30 TABLET | Refills: 0 | Status: SHIPPED | OUTPATIENT
Start: 2022-09-16 | End: 2023-01-10 | Stop reason: ALTCHOICE
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
## 2022-09-16 NOTE — TELEPHONE ENCOUNTER
Called pt again at 4:26 pm  No answer, was able to finally leave a message asking pt to please return my call before the office closes today

## 2022-09-16 NOTE — TELEPHONE ENCOUNTER
Attempted to call pt at number provided  Number doesn't ring, called mothers number, LM for her to call office with a phone number to reach pt  Also tried fathers number in chart, that number is not working

## 2022-09-16 NOTE — TELEPHONE ENCOUNTER
Rx Olanzapine low dose entered to help with atypical anxiety/bipolar disorder  We will try to arrange with Psychiatrist but she should go to ER if feelings of self harm increase

## 2022-09-16 NOTE — TELEPHONE ENCOUNTER
Medication not working for her  Yesterday she wanted to harm herself  Her therapist thins she may be bipolar  Has the questionnaires that she filled out for therapist   Please call her

## 2022-09-22 ENCOUNTER — SOCIAL WORK (OUTPATIENT)
Dept: BEHAVIORAL/MENTAL HEALTH CLINIC | Facility: CLINIC | Age: 22
End: 2022-09-22
Payer: COMMERCIAL

## 2022-09-22 DIAGNOSIS — F39 MOOD DISORDER (HCC): ICD-10-CM

## 2022-09-22 DIAGNOSIS — F41.1 GENERALIZED ANXIETY DISORDER: Primary | ICD-10-CM

## 2022-09-22 PROCEDURE — 90834 PSYTX W PT 45 MINUTES: CPT | Performed by: SOCIAL WORKER

## 2022-09-22 NOTE — PSYCH
Psychotherapy Provided: Individual Psychotherapy 45 minutes     Length of time in session: 1:30 pm to 2:15 pm, follow up in TBD     Encounter Diagnosis     ICD-10-CM    1  Generalized anxiety disorder  F41 1    2  Mood disorder ([facility] [address] )  F39        Goals addressed in session: NA    Pain:      Moderate    6    Current suicide risk : Low     Therapist met with Mardeen Mood  Therapist and Mardeen Mood discussed her symptoms and explored concerns with her current medication regime  She shared that she does not want to take the current prescription and would like to see psychiatry  Therapist assisted with connecting her with psychiatry  Therapist contacted [facility] to set up psychiatry for Mardeen Mood  Therapist spoke with Mom regarding medication  Mom was not supportive of this and will communicate with alternative provider at a later date  Behavioral Health Treatment Plan [facility]: Diagnosis and Treatment Plan explained to Taj Stuart relates understanding diagnosis and is agreeable to Treatment Plan   Yes

## 2022-12-27 ENCOUNTER — CLINICAL SUPPORT (OUTPATIENT)
Dept: FAMILY MEDICINE CLINIC | Facility: HOSPITAL | Age: 22
End: 2022-12-27

## 2022-12-27 ENCOUNTER — TELEPHONE (OUTPATIENT)
Dept: FAMILY MEDICINE CLINIC | Facility: HOSPITAL | Age: 22
End: 2022-12-27

## 2022-12-27 VITALS — TEMPERATURE: 98.2 F

## 2022-12-27 DIAGNOSIS — J03.00 STREP TONSILLITIS: Primary | ICD-10-CM

## 2022-12-27 DIAGNOSIS — J02.9 PHARYNGITIS, UNSPECIFIED ETIOLOGY: Primary | ICD-10-CM

## 2022-12-27 LAB — S PYO AG THROAT QL: POSITIVE

## 2022-12-27 RX ORDER — AMOXICILLIN 500 MG/1
500 CAPSULE ORAL EVERY 8 HOURS SCHEDULED
Qty: 30 CAPSULE | Refills: 0 | Status: SHIPPED | OUTPATIENT
Start: 2022-12-27 | End: 2023-01-06

## 2022-12-27 NOTE — TELEPHONE ENCOUNTER
Patient tested positive for strep today      Asking if she can go back to work today or is she still contagious?    pcb

## 2022-12-27 NOTE — PROGRESS NOTES
Patient seen for nurse visit to have rapid strep swab done  Has been having a sore throat x few days  White spots on tonsils  Covid test negative  Pt was swabbed and rapid strep positive  Message sent to Dr Mercedes Castro to have abx sent in

## 2022-12-27 NOTE — TELEPHONE ENCOUNTER
Spoke to pt, she is going to do a home COVID test and let us know the result  If  negative we can have her come in for nurse visit and swab for rapid strep and throat culture  If Positive check with Dr Caputo Indigo

## 2022-12-27 NOTE — TELEPHONE ENCOUNTER
Patient has had a sore throat, swollen glands, and white dots on the back of her throat since yesterday  Sore throat has lasted longer  No mention of fever  Do we want to see her or can she just be swabbed downstairs for strep?

## 2022-12-27 NOTE — TELEPHONE ENCOUNTER
Pt aware  Pt is asking if you can write a work note for her for today and tomorrow? She left work early today and will not be able to go in tomorrow

## 2023-01-10 ENCOUNTER — OFFICE VISIT (OUTPATIENT)
Dept: FAMILY MEDICINE CLINIC | Facility: HOSPITAL | Age: 23
End: 2023-01-10

## 2023-01-10 VITALS
WEIGHT: 134.8 LBS | DIASTOLIC BLOOD PRESSURE: 83 MMHG | TEMPERATURE: 97.8 F | SYSTOLIC BLOOD PRESSURE: 124 MMHG | BODY MASS INDEX: 24.8 KG/M2 | HEIGHT: 62 IN | HEART RATE: 82 BPM | OXYGEN SATURATION: 100 %

## 2023-01-10 DIAGNOSIS — S70.01XD CONTUSION OF RIGHT HIP, SUBSEQUENT ENCOUNTER: ICD-10-CM

## 2023-01-10 DIAGNOSIS — S80.02XD CONTUSION OF LEFT KNEE, SUBSEQUENT ENCOUNTER: ICD-10-CM

## 2023-01-10 DIAGNOSIS — S60.211D: ICD-10-CM
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
## 2023-01-10 PROBLEM — S70.01XA CONTUSION OF RIGHT HIP: Status: ACTIVE | Noted: 2023-01-10

## 2023-01-10 PROBLEM — S80.02XA CONTUSION OF LEFT KNEE: Status: ACTIVE | Noted: 2023-01-10

## 2023-01-10 PROBLEM — S60.211A: Status: ACTIVE | Noted: 2023-01-10
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
## 2023-01-20 ENCOUNTER — TELEPHONE (OUTPATIENT)
Dept: FAMILY MEDICINE CLINIC | Facility: HOSPITAL | Age: 23
End: 2023-01-20

## 2023-01-20 DIAGNOSIS — J03.00 STREP TONSILLITIS: Primary | ICD-10-CM

## 2023-01-20 RX ORDER — AMOXICILLIN AND CLAVULANATE POTASSIUM 875; 125 MG/1; MG/1
1 TABLET, FILM COATED ORAL EVERY 12 HOURS SCHEDULED
Qty: 20 TABLET | Refills: 0 | Status: SHIPPED | OUTPATIENT
Start: 2023-01-20 | End: 2023-01-30

## 2023-01-20 NOTE — TELEPHONE ENCOUNTER
Patient states that she was treated several weeks with antibiotics for step throat  She states that she woke up this morning with swollen tonsils and white spots on the back of her throat  She thinks she has strep again  Patient asking for more antibiotics  Please call patient to advise

## 2023-02-01 ENCOUNTER — TELEPHONE (OUTPATIENT)
Dept: FAMILY MEDICINE CLINIC | Facility: HOSPITAL | Age: 23
End: 2023-02-01

## 2023-02-01 ENCOUNTER — HOSPITAL ENCOUNTER (OUTPATIENT)
Dept: RADIOLOGY | Facility: HOSPITAL | Age: 23
Discharge: HOME | End: 2023-02-01

## 2023-02-01 DIAGNOSIS — S80.02XD CONTUSION OF LEFT KNEE, SUBSEQUENT ENCOUNTER: Primary | ICD-10-CM

## 2023-02-01 DIAGNOSIS — S80.02XD CONTUSION OF LEFT KNEE, SUBSEQUENT ENCOUNTER: ICD-10-CM
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
## 2023-04-19 DIAGNOSIS — H65.07 RECURRENT ACUTE SEROUS OTITIS MEDIA, UNSPECIFIED LATERALITY: Primary | ICD-10-CM

## 2023-04-19 RX ORDER — CEFDINIR 300 MG/1
300 CAPSULE ORAL EVERY 12 HOURS SCHEDULED
Qty: 14 CAPSULE | Refills: 0 | Status: SHIPPED | OUTPATIENT
Start: 2023-04-19 | End: 2023-04-26

## 2023-05-01 ENCOUNTER — OFFICE VISIT (OUTPATIENT)
Dept: FAMILY MEDICINE CLINIC | Facility: HOSPITAL | Age: 23
End: 2023-05-01

## 2023-05-01 VITALS
BODY MASS INDEX: 24.59 KG/M2 | WEIGHT: 133.6 LBS | DIASTOLIC BLOOD PRESSURE: 80 MMHG | HEART RATE: 82 BPM | TEMPERATURE: 97.8 F | SYSTOLIC BLOOD PRESSURE: 126 MMHG | OXYGEN SATURATION: 99 % | HEIGHT: 62 IN

## 2023-05-01 DIAGNOSIS — S01.23XA INFECTED NASAL PIERCING: Primary | ICD-10-CM

## 2023-05-01 DIAGNOSIS — L08.9 INFECTED NASAL PIERCING: Primary | ICD-10-CM

## 2023-05-01 NOTE — PROGRESS NOTES
"Assessment/Plan:     Her piercing appears normal  I do not feel it is infected  Given report of purulent drainage and pain will treat with topical antibiotic  Diagnoses and all orders for this visit:    Infected nasal piercing  -     mupirocin (BACTROBAN) 2 % ointment; Apply topically 3 (three) times a day          Subjective:     Patient ID: Chuck Escudero is a 20 y o  female  Infected septum piercing for 2 days  Pain around piercing  Noticed purulent drainage yesterday  Felt warm and having chills  Temp was 100  Tried neosporin ointment and felt it helped temporarily  Uses unscented soap and salt water to clean  Recently had a sinus infection  Just completed antibiotic treatment last week  Felt better and now feeling \"stuffed up\" again  Review of Systems   Constitutional: Positive for chills and fever  HENT: Positive for congestion  Pain at nose piercing         The following portions of the patient's history were reviewed and updated as appropriate: allergies, current medications, past family history, past medical history, past social history, past surgical history and problem list     Objective:  Vitals:    05/01/23 1014   BP: 126/80   Pulse: 82   Temp: 97 8 °F (36 6 °C)   SpO2: 99%      Physical Exam  Vitals reviewed  Constitutional:       Appearance: Normal appearance  HENT:      Right Ear: Tympanic membrane, ear canal and external ear normal       Left Ear: Tympanic membrane, ear canal and external ear normal       Nose:        Mouth/Throat:      Mouth: Mucous membranes are moist       Pharynx: Oropharynx is clear  Cardiovascular:      Rate and Rhythm: Normal rate and regular rhythm  Heart sounds: Normal heart sounds  No murmur heard  Pulmonary:      Effort: Pulmonary effort is normal       Breath sounds: Normal breath sounds  Skin:     General: Skin is warm and dry  Neurological:      Mental Status: She is alert and oriented to person, place, and time   " Psychiatric:         Mood and Affect: Mood normal          Behavior: Behavior normal          Thought Content:  Thought content normal          Judgment: Judgment normal

## 2023-05-01 NOTE — LETTER
May 1, 2023     Patient: Ema Arguelles  YOB: 2000  Date of Visit: 5/1/2023      To Whom it May Concern:    Faisal Monet is under my professional care  Katey Calderon was seen in my office on 5/1/2023  Katey Calderon may return to work on 5/2/2023  If you have any questions or concerns, please don't hesitate to call           Sincerely,          DARIEL Torres        CC: No Recipients

## 2023-05-18 ENCOUNTER — OFFICE VISIT (OUTPATIENT)
Dept: FAMILY MEDICINE CLINIC | Facility: HOSPITAL | Age: 23
End: 2023-05-18

## 2023-05-18 VITALS
SYSTOLIC BLOOD PRESSURE: 102 MMHG | OXYGEN SATURATION: 99 % | DIASTOLIC BLOOD PRESSURE: 60 MMHG | HEIGHT: 62 IN | HEART RATE: 96 BPM | WEIGHT: 130.6 LBS | BODY MASS INDEX: 24.03 KG/M2 | TEMPERATURE: 98.8 F

## 2023-05-18 DIAGNOSIS — J45.20 MILD INTERMITTENT ASTHMA WITHOUT COMPLICATION: ICD-10-CM

## 2023-05-18 DIAGNOSIS — J98.8 RESPIRATORY INFECTION: Primary | ICD-10-CM

## 2023-05-18 PROBLEM — J45.30 MILD PERSISTENT ASTHMA WITHOUT COMPLICATION: Status: RESOLVED | Noted: 2019-09-23 | Resolved: 2023-05-18

## 2023-05-18 NOTE — ASSESSMENT & PLAN NOTE
No current s/sx of acute asthma exacerbation, urged to watch closely and if no better OR if using albuterol more frequently then she needs to call and will rx PO steroid

## 2023-05-18 NOTE — PROGRESS NOTES
Name: Dilip Johnson      : 2000      MRN: [id number]  Encounter Provider: Shania Martin DO  Encounter Date: 2023   Encounter department: [facility] [address]  Respiratory infection  Comments:  Reassured within nml duration of viral illness and exam nml, stressed higher risk with asthma but currently noting benefit with restarting bid Flovent, oral steroid reviewed and pt deferring which is reasonable given benefit with inhaled Flovent, gargles/hot tea/lozenges/rest/fluids and con't OTC decongestant and antihistamine with nasal Flonase encouraged, if no better at all by Wed 23 OR with new/worse symptoms/increase albuterol use/worse SOB then call and will start abx and PO steroid    2  Mild intermittent asthma without complication  Assessment & Plan:  No current s/sx of acute asthma exacerbation, urged to watch closely and if no better OR if using albuterol more frequently then she needs to call and will rx PO steroid       Note given off of work -23 back 23    Subjective      HPI Pt here today for an acute visit    Pt with 3 days of congestion and cough  Yesterday she started to develop a fever with  2  Cough has become productive  She notes no SOB/wheezing/ST/ear pain/N/V/rashes  She has had some diarrhea but no abd pain or blood in the stool  She has had sick contacts at work  She has been using OTC cough medication and Motrin  She is using an antihistamine for her allergies as well  She notes her asthma has been acting up and she started using Flovent bid with benefit  She is using albuterol prn - usually at baseline she does not use daily but recently she has been using albuterol bid  She states symptoms are worse the past few days  Review of Systems   Constitutional: Positive for appetite change, chills, fatigue and fever  HENT: Positive for congestion  Negative for ear pain and sinus pressure  "  Eyes: Negative for discharge, redness and itching  Respiratory: Positive for cough and chest tightness  Negative for shortness of breath and wheezing  Cardiovascular: Negative for chest pain and palpitations  Gastrointestinal: Positive for constipation and diarrhea  Negative for abdominal pain, nausea and vomiting  Genitourinary: Negative for difficulty urinating and dysuria  Musculoskeletal: Negative for arthralgias and myalgias  Skin: Negative for rash and wound  Neurological: Positive for headaches  Negative for dizziness and light-headedness  Current Outpatient Medications on File Prior to Visit   Medication Sig   • albuterol (PROVENTIL HFA,VENTOLIN HFA) 90 mcg/act inhaler Inhale 2 puffs every 6 (six) hours as needed for wheezing   • fluticasone (FLONASE) 50 mcg/act nasal spray Use 2 sprays in each nostril at bedtime   • fluticasone (Flovent HFA) 44 mcg/act inhaler Inhale 2 puffs 2 (two) times a day Rinse mouth after use  • [DISCONTINUED] mupirocin (BACTROBAN) 2 % ointment Apply topically 3 (three) times a day       Objective     /60   Pulse 96   Temp 98 8 °F (37 1 °C) (Tympanic)   Ht 5' 2\" (1 575 m)   Wt 59 2 kg (130 lb 9 6 oz)   SpO2 99%   BMI 23 89 kg/m²     Physical Exam  Vitals and nursing note reviewed  Constitutional:       General: She is not in acute distress  Appearance: She is well-developed  She is not ill-appearing  HENT:      Head: Normocephalic and atraumatic  Right Ear: Tympanic membrane and external ear normal  There is no impacted cerumen  Left Ear: Tympanic membrane and external ear normal  There is no impacted cerumen  Mouth/Throat:      Mouth: Mucous membranes are moist       Pharynx: Oropharynx is clear  Posterior oropharyngeal erythema present  No oropharyngeal exudate  Eyes:      General:         Right eye: No discharge  Left eye: No discharge        Conjunctiva/sclera: Conjunctivae normal    Neck:      Trachea: No " tracheal deviation  Cardiovascular:      Rate and Rhythm: Normal rate and regular rhythm  Heart sounds: Normal heart sounds  No murmur heard  No friction rub  Pulmonary:      Effort: Pulmonary effort is normal  No respiratory distress  Breath sounds: Normal breath sounds  No wheezing, rhonchi or rales  Abdominal:      General: There is no distension  Palpations: Abdomen is soft  Tenderness: There is no abdominal tenderness  There is no guarding or rebound  Musculoskeletal:      Cervical back: Neck supple  Lymphadenopathy:      Cervical: No cervical adenopathy  Skin:     General: Skin is warm  Coloration: Skin is not pale  Findings: No rash  Neurological:      General: No focal deficit present  Mental Status: She is alert  Motor: No abnormal muscle tone  Gait: Gait normal    Psychiatric:         Behavior: Behavior normal          Thought Content:  Thought content normal          Judgment: Judgment normal        Adron Grooms, DO

## 2023-05-18 NOTE — LETTER
May 18, 2023     Patient: Efrain De La Fuente  YOB: 2000  Date of Visit: 5/18/2023      To Whom it May Concern:    Godwin Juarez is under my professional care  Stephanie Lynch was seen in my office on 5/18/2023  Please excuse Stephanie Lynch from work 5/17/2023 to 5/19/2023 due to medical illness  Stephanie Lynch may return to work on 5/22/2023 without restrictions  If you have any questions or concerns, please don't hesitate to call           Sincerely,          Kayla Simpson, DO        CC: No Recipients

## 2023-12-11 ENCOUNTER — TELEPHONE (OUTPATIENT)
Dept: PSYCHIATRY | Facility: CLINIC | Age: 23
End: 2023-12-11

## 2023-12-14 ENCOUNTER — TELEPHONE (OUTPATIENT)
Dept: PSYCHIATRY | Facility: CLINIC | Age: 23
End: 2023-12-14

## 2023-12-14 NOTE — TELEPHONE ENCOUNTER
LVM regarding possible [facility] funding as client currently has not insurance.

## 2024-03-15 ENCOUNTER — TELEPHONE (OUTPATIENT)
Dept: PSYCHIATRY | Facility: CLINIC | Age: 24
End: 2024-03-15

## 2024-04-02 ENCOUNTER — TELEPHONE (OUTPATIENT)
Dept: PSYCHIATRY | Facility: CLINIC | Age: 24
End: 2024-04-02

## 2024-04-02 NOTE — TELEPHONE ENCOUNTER
Several attempts were made to contact client with no response.  Clt has been removed from the wait list.

## 2024-04-04 ENCOUNTER — OFFICE VISIT (OUTPATIENT)
Dept: FAMILY MEDICINE CLINIC | Facility: HOSPITAL | Age: 24
End: 2024-04-04

## 2024-04-04 VITALS
TEMPERATURE: 98.5 F | HEIGHT: 62 IN | DIASTOLIC BLOOD PRESSURE: 74 MMHG | OXYGEN SATURATION: 99 % | SYSTOLIC BLOOD PRESSURE: 104 MMHG | HEART RATE: 71 BPM | BODY MASS INDEX: 23.55 KG/M2 | WEIGHT: 128 LBS | RESPIRATION RATE: 16 BRPM

## 2024-04-04 DIAGNOSIS — R35.0 URINARY FREQUENCY: ICD-10-CM

## 2024-04-04 DIAGNOSIS — N30.00 ACUTE CYSTITIS WITHOUT HEMATURIA: Primary | ICD-10-CM

## 2024-04-04 DIAGNOSIS — R39.15 URINARY URGENCY: ICD-10-CM

## 2024-04-04 DIAGNOSIS — R30.0 DYSURIA: ICD-10-CM

## 2024-04-04 LAB
SL AMB  POCT GLUCOSE, UA: ABNORMAL
SL AMB LEUKOCYTE ESTERASE,UA: ABNORMAL
SL AMB POCT BILIRUBIN,UA: ABNORMAL
SL AMB POCT BLOOD,UA: ABNORMAL
SL AMB POCT CLARITY,UA: ABNORMAL
SL AMB POCT COLOR,UA: YELLOW
SL AMB POCT KETONES,UA: ABNORMAL
SL AMB POCT NITRITE,UA: ABNORMAL
SL AMB POCT PH,UA: 6
SL AMB POCT SPECIFIC GRAVITY,UA: 1.02
SL AMB POCT URINE PROTEIN: ABNORMAL
SL AMB POCT UROBILINOGEN: ABNORMAL

## 2024-04-04 PROCEDURE — 81003 URINALYSIS AUTO W/O SCOPE: CPT | Performed by: INTERNAL MEDICINE

## 2024-04-04 PROCEDURE — 99213 OFFICE O/P EST LOW 20 MIN: CPT | Performed by: INTERNAL MEDICINE

## 2024-04-04 RX ORDER — SULFAMETHOXAZOLE AND TRIMETHOPRIM 800; 160 MG/1; MG/1
1 TABLET ORAL EVERY 12 HOURS SCHEDULED
Qty: 6 TABLET | Refills: 0 | Status: SHIPPED | OUTPATIENT
Start: 2024-04-04 | End: 2024-04-07

## 2024-04-04 NOTE — PROGRESS NOTES
"Assessment/Plan:    No problem-specific Assessment & Plan notes found for this encounter.       Diagnoses and all orders for this visit:    Acute cystitis without hematuria  -     sulfamethoxazole-trimethoprim (BACTRIM DS) 800-160 mg per tablet; Take 1 tablet by mouth every 12 (twelve) hours for 3 days  -     UA/M w/rflx Culture, Routine; Future    Dysuria  -     POCT urine dip auto non-scope  -     UA/M w/rflx Culture, Routine; Future    Urinary frequency  -     POCT urine dip auto non-scope  -     UA/M w/rflx Culture, Routine; Future    Urinary urgency  -     POCT urine dip auto non-scope  -     UA/M w/rflx Culture, Routine; Future          Urine dip is positive for leuks and blood.  She most likely has acute cystitis.  Will treat her with Bactrim for 3 days    Subjective:      Patient ID: Estrella Menon is a 23 y.o. female.      Patient presents with chief complaint of urinary frequency, urgency, dysuria, lower abdominal pain after urination has been going on since Sunday.  She is also having her period.  Denies fevers, nausea, vomiting.        Patient presents for follow-up of chronic conditions as detailed in the assessment and plan.      The following portions of the patient's history were reviewed and updated as appropriate: current medications, past family history, past medical history, past social history, past surgical history and problem list.    Review of Systems      Objective:    /74   Pulse 71   Temp 98.5 °F (36.9 °C) (Tympanic)   Resp 16   Ht 5' 2\" (1.575 m)   Wt 58.1 kg (128 lb)   SpO2 99%   BMI 23.41 kg/m²      Physical Exam  Constitutional:       General: She is not in acute distress.     Appearance: She is not toxic-appearing.   HENT:      Head: Normocephalic.   Cardiovascular:      Rate and Rhythm: Normal rate and regular rhythm.      Heart sounds: No murmur heard.  Pulmonary:      Effort: No respiratory distress.      Breath sounds: No wheezing.   Abdominal:      General: Bowel " sounds are normal. There is no distension.      Palpations: Abdomen is soft.      Tenderness: There is no abdominal tenderness. There is no right CVA tenderness or left CVA tenderness.   Neurological:      Mental Status: She is alert.             Marielos Rodriguez MD

## 2024-04-10 LAB
APPEARANCE UR: ABNORMAL
BACTERIA UR CULT: ABNORMAL
BACTERIA URNS QL MICRO: ABNORMAL
BILIRUB UR QL STRIP: NEGATIVE
CASTS URNS QL MICRO: ABNORMAL /LPF
COLOR UR: YELLOW
EPI CELLS #/AREA URNS HPF: ABNORMAL /HPF (ref 0–10)
GLUCOSE UR QL: NEGATIVE
HGB UR QL STRIP: ABNORMAL
KETONES UR QL STRIP: NEGATIVE
LEUKOCYTE ESTERASE UR QL STRIP: ABNORMAL
Lab: ABNORMAL
MICRO URNS: ABNORMAL
NITRITE UR QL STRIP: NEGATIVE
PH UR STRIP: 6.5 [PH] (ref 5–7.5)
PROT UR QL STRIP: ABNORMAL
RBC #/AREA URNS HPF: ABNORMAL /HPF (ref 0–2)
SL AMB URINALYSIS REFLEX: ABNORMAL
SP GR UR: 1.02 (ref 1–1.03)
UROBILINOGEN UR STRIP-ACNC: 0.2 MG/DL (ref 0.2–1)
WBC #/AREA URNS HPF: ABNORMAL /HPF (ref 0–5)

## 2024-05-31 DIAGNOSIS — J45.20 MILD INTERMITTENT ASTHMA WITHOUT COMPLICATION: ICD-10-CM

## 2024-05-31 RX ORDER — ALBUTEROL SULFATE 90 UG/1
2 AEROSOL, METERED RESPIRATORY (INHALATION) EVERY 6 HOURS PRN
Qty: 18 G | Refills: 3 | Status: SHIPPED | OUTPATIENT
Start: 2024-05-31

## 2024-05-31 RX ORDER — FLUTICASONE PROPIONATE 44 UG/1
2 AEROSOL, METERED RESPIRATORY (INHALATION) 2 TIMES DAILY
Qty: 10.6 G | Refills: 3 | Status: SHIPPED | OUTPATIENT
Start: 2024-05-31 | End: 2025-05-31

## 2025-01-22 ENCOUNTER — OFFICE VISIT (OUTPATIENT)
Dept: FAMILY MEDICINE CLINIC | Facility: HOSPITAL | Age: 25
End: 2025-01-22

## 2025-01-22 VITALS
WEIGHT: 133 LBS | HEIGHT: 62 IN | BODY MASS INDEX: 24.48 KG/M2 | DIASTOLIC BLOOD PRESSURE: 70 MMHG | SYSTOLIC BLOOD PRESSURE: 112 MMHG | OXYGEN SATURATION: 98 % | HEART RATE: 83 BPM

## 2025-01-22 DIAGNOSIS — F41.1 GENERALIZED ANXIETY DISORDER: ICD-10-CM

## 2025-01-22 DIAGNOSIS — Z12.4 SCREENING FOR CERVICAL CANCER: ICD-10-CM

## 2025-01-22 DIAGNOSIS — J45.20 MILD INTERMITTENT ASTHMA WITHOUT COMPLICATION: ICD-10-CM

## 2025-01-22 DIAGNOSIS — F39 MOOD DISORDER (HCC): ICD-10-CM

## 2025-01-22 DIAGNOSIS — J45.21 MILD INTERMITTENT ASTHMA WITH ACUTE EXACERBATION: Primary | ICD-10-CM

## 2025-01-22 PROCEDURE — 99395 PREV VISIT EST AGE 18-39: CPT | Performed by: INTERNAL MEDICINE

## 2025-01-22 RX ORDER — FLUTICASONE PROPIONATE 44 UG/1
2 AEROSOL, METERED RESPIRATORY (INHALATION) 2 TIMES DAILY
Qty: 10.6 G | Refills: 3 | Status: SHIPPED | OUTPATIENT
Start: 2025-01-22 | End: 2026-01-22
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
## 2025-01-22 NOTE — ASSESSMENT & PLAN NOTE
Has no insurance currently- will refill with printed rx for disc inhaler as  that appears to be cheaper with good rx-

## 2025-01-22 NOTE — ASSESSMENT & PLAN NOTE
Overall doing well- was on meds such as lexapro in past- then got suicidal thoughts    Age 21 dx with this- was triggered by switch from lexapro - now  off all meds   Goes to therapy once monthly

## 2025-01-22 NOTE — ASSESSMENT & PLAN NOTE
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

## 2025-01-22 NOTE — ASSESSMENT & PLAN NOTE
Has no insurance currently- will refill with printed rx for disc inhaler as  that appears to be cheaper with good rx-   Orders:  •  fluticasone (Flovent HFA) 44 mcg/act inhaler; Inhale 2 puffs 2 (two) times a day Rinse mouth after use.

## 2025-02-20 ENCOUNTER — APPOINTMENT (OUTPATIENT)
Dept: URGENT CARE | Facility: CLINIC | Age: 25
End: 2025-02-20

## 2025-03-06 ENCOUNTER — OFFICE VISIT (OUTPATIENT)
Dept: FAMILY MEDICINE CLINIC | Facility: HOSPITAL | Age: 25
End: 2025-03-06

## 2025-03-06 VITALS
HEIGHT: 62 IN | HEART RATE: 63 BPM | OXYGEN SATURATION: 96 % | BODY MASS INDEX: 24.29 KG/M2 | DIASTOLIC BLOOD PRESSURE: 78 MMHG | TEMPERATURE: 98.6 F | SYSTOLIC BLOOD PRESSURE: 136 MMHG | WEIGHT: 132 LBS | RESPIRATION RATE: 16 BRPM

## 2025-03-06 DIAGNOSIS — S90.121A CONTUSION OF TOE OF RIGHT FOOT, UNSPECIFIED TOE, INITIAL ENCOUNTER: Primary | ICD-10-CM
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
## 2025-03-06 PROCEDURE — 99213 OFFICE O/P EST LOW 20 MIN: CPT | Performed by: INTERNAL MEDICINE
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
## 2025-06-03 ENCOUNTER — OFFICE VISIT (OUTPATIENT)
Dept: FAMILY MEDICINE CLINIC | Facility: HOSPITAL | Age: 25
End: 2025-06-03

## 2025-06-03 VITALS
HEART RATE: 100 BPM | SYSTOLIC BLOOD PRESSURE: 118 MMHG | DIASTOLIC BLOOD PRESSURE: 75 MMHG | WEIGHT: 130 LBS | BODY MASS INDEX: 23.78 KG/M2 | OXYGEN SATURATION: 99 %

## 2025-06-03 DIAGNOSIS — Z13.29 SCREENING FOR THYROID DISORDER: ICD-10-CM

## 2025-06-03 DIAGNOSIS — J45.20 MILD INTERMITTENT ASTHMA WITHOUT COMPLICATION: ICD-10-CM

## 2025-06-03 DIAGNOSIS — J45.21 MILD INTERMITTENT ASTHMA WITH ACUTE EXACERBATION: Primary | ICD-10-CM

## 2025-06-03 DIAGNOSIS — L20.84 INTRINSIC ECZEMA: ICD-10-CM

## 2025-06-03 DIAGNOSIS — Z13.0 SCREENING FOR DEFICIENCY ANEMIA: ICD-10-CM

## 2025-06-03 DIAGNOSIS — E55.9 VITAMIN D DEFICIENCY: ICD-10-CM

## 2025-06-03 DIAGNOSIS — F41.1 GENERALIZED ANXIETY DISORDER: ICD-10-CM

## 2025-06-03 PROCEDURE — 99214 OFFICE O/P EST MOD 30 MIN: CPT | Performed by: NURSE PRACTITIONER

## 2025-06-03 RX ORDER — MULTIVITAMIN
1 TABLET ORAL DAILY
COMMUNITY

## 2025-06-03 RX ORDER — ALBUTEROL SULFATE 90 UG/1
2 INHALANT RESPIRATORY (INHALATION) EVERY 6 HOURS PRN
Qty: 18 G | Refills: 3 | Status: SHIPPED | OUTPATIENT
Start: 2025-06-03

## 2025-06-03 RX ORDER — FERROUS SULFATE 325(65) MG
325 TABLET, DELAYED RELEASE (ENTERIC COATED) ORAL
COMMUNITY

## 2025-06-03 NOTE — PATIENT INSTRUCTIONS
Referral to allergy  Check labwork. No caffeine/food for 8 hours.  Drink plenty of water  Use of Claritin or zyrtec at bedtime  Use of Flovent daily.  Use of albuterol as needed/directed.   Eucerin eczema repair cream.

## 2025-06-03 NOTE — ASSESSMENT & PLAN NOTE
Reports increasing asthma symptoms.  Her asthma is triggered by exertion, allergies, and anxiety.  Seen in January and prescribed Flovent which she has not been taking consistently.  She continues to use albuterol but uses it only when she takes the Flovent, which is inconsistently.  Patient educated on medication regimen and frequency and verbalizes understanding.    -Pulse ox measured during exam, found to be 92 to 95% range.  - Reinforced need to use medications as prescribed. Flovent inhaler 2 puffs twice daily and use albuterol inhaler 2 puffs every 6 hours only as needed.  - Refilled albuterol prescription

## 2025-06-03 NOTE — PROGRESS NOTES
[facility]   Aiyana VIEIRA    Assessment/Plan:   1. Mild intermittent asthma with acute exacerbation  Assessment & Plan:  Reports increasing asthma symptoms.  Her asthma is triggered by exertion, allergies, and anxiety.  Seen in January and prescribed Flovent which she has not been taking consistently.  She continues to use albuterol but uses it only when she takes the Flovent, which is inconsistently.  Patient educated on medication regimen and frequency and verbalizes understanding.    -Pulse ox measured during exam, found to be 92 to 95% range.  - Reinforced need to use medications as prescribed. Flovent inhaler 2 puffs twice daily and use albuterol inhaler 2 puffs every 6 hours only as needed.  - Refilled albuterol prescription  Orders:  -     Ambulatory Referral to Allergy; Future  2. 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  Feels this has increased her anxiety, missing work today.  Educated patient that increased use of albuterol can also increase anxiety.    Was prescribed Lexapro in the past, but was experiencing suicidal thoughts so this was discontinued.  Has not tried any additional medications.  - Currently sees a therapist, will continue to do so as she feels this is beneficial.  3. Intrinsic eczema  Assessment & Plan:  Eczema exacerbated by seasonal allergies, with areas on bilateral upper extremities, and complaints of patchiness near her eyes.  She has been using Benadryl occasionally for this, and colloidal oatmeal lotion with minimal relief.    -Recommend use of Claritin or Zyrtec at bedtime  -Referral placed for allergy  -Eucerin eczema repair cream  Orders:  -     Ambulatory Referral to Allergy; Future  -     Comprehensive metabolic panel; Future  -     Comprehensive metabolic panel  4. Mild intermittent asthma without complication  -     albuterol (PROVENTIL HFA,VENTOLIN HFA) 90 mcg/act  inhaler; Inhale 2 puffs every 6 (six) hours as needed for wheezing  -     Comprehensive metabolic panel; Future  -     Comprehensive metabolic panel  5. Screening for thyroid disorder  -     TSH, 3rd generation with Free T4 reflex; Future  -     TSH, 3rd generation with Free T4 reflex  6. Screening for deficiency anemia  -     CBC and differential; Future  -     CBC and differential  7. Vitamin D deficiency  -     Vitamin D 25 hydroxy; Future; Expected date: Collect anytime  -     Vitamin D 25 hydroxy      Referral to allergy  Check labwork. No caffeine/food for 8 hours.  Drink plenty of water  Use of Claritin or zyrtec at bedtime  Use of Flovent daily.  Use of albuterol as needed/directed.   Eucerin eczema repair cream.   No follow-ups on file.  Patient may call or return to office with any questions or concerns.     ______________________________________________________________________  Subjective:       Patient ID: Estrella Menon is a 24 y.o. female.  Estrella Menon  Chief Complaint   Patient presents with    Asthma    Anxiety       See A/P               The following portions of the patient's history were reviewed and updated as appropriate: allergies, current medications, past family history, past medical history, past social history, past surgical history, and problem list.    Review of Systems   Constitutional: Negative.  Negative for activity change, appetite change, chills, fatigue and fever.   HENT:  Positive for congestion, sinus pressure, sinus pain and sneezing. Negative for ear pain and postnasal drip.    Eyes:  Positive for discharge and itching.   Respiratory:  Positive for cough, chest tightness, shortness of breath and wheezing.    Cardiovascular:  Positive for palpitations. Negative for chest pain and leg swelling.   Gastrointestinal: Negative.  Negative for abdominal pain, constipation, diarrhea, nausea and vomiting.   Endocrine: Negative.  Negative for polydipsia and polyuria.    Genitourinary:  Positive for menstrual problem. Negative for dysuria and frequency.        Heavy periods     Musculoskeletal:  Positive for back pain.        Chronic low back pain      Skin:  Positive for rash.        Eczema      Allergic/Immunologic: Positive for environmental allergies. Negative for immunocompromised state.   Neurological: Negative.  Negative for dizziness, light-headedness and headaches.   Hematological: Negative.    Psychiatric/Behavioral:  Negative for sleep disturbance. The patient is nervous/anxious.          Objective:      Vitals:    06/03/25 1507   BP: 118/75   Pulse: 100   SpO2: 99%      Physical Exam  Vitals and nursing note reviewed.   Constitutional:       Appearance: Normal appearance.   HENT:      Head: Normocephalic and atraumatic.      Right Ear: Tympanic membrane, ear canal and external ear normal.      Left Ear: Tympanic membrane, ear canal and external ear normal.      Nose: Nose normal.      Mouth/Throat:      Mouth: Mucous membranes are moist.      Pharynx: Oropharynx is clear.     Eyes:      General: No allergic shiner.     Extraocular Movements: Extraocular movements intact.      Conjunctiva/sclera: Conjunctivae normal.      Right eye: Right conjunctiva is not injected.      Left eye: Left conjunctiva is not injected.      Pupils: Pupils are equal, round, and reactive to light.       Cardiovascular:      Rate and Rhythm: Normal rate and regular rhythm.      Pulses: Normal pulses.      Heart sounds: Normal heart sounds.   Pulmonary:      Effort: Pulmonary effort is normal.      Breath sounds: Decreased air movement present. Examination of the right-middle field reveals decreased breath sounds. Examination of the left-middle field reveals decreased breath sounds. Examination of the right-lower field reveals decreased breath sounds. Examination of the left-lower field reveals decreased breath sounds. Decreased breath sounds present. No wheezing or rhonchi.      Comments:  "Speech rapid and pressed without dyspnea.   Abdominal:      General: Abdomen is flat. Bowel sounds are normal.      Palpations: Abdomen is soft.     Musculoskeletal:         General: Normal range of motion.      Cervical back: Normal range of motion and neck supple.     Skin:     General: Skin is warm and dry.     Neurological:      General: No focal deficit present.      Mental Status: She is alert and oriented to person, place, and time.     Psychiatric:         Attention and Perception: Attention normal.         Mood and Affect: Mood is anxious.         Speech: Speech is rapid and pressured.         Behavior: Behavior normal. Behavior is cooperative.         Thought Content: Thought content normal.         Cognition and Memory: Cognition normal.         Judgment: Judgment normal.           Portions of the record may have been created with voice recognition software. Occasional wrong word or \"sound alike\" substitutions may have occurred due to the inherent limitations of voice recognition software. Please review the chart carefully and recognize, using context, where substitutions/typographical errors may have occurred.       "

## 2025-06-03 NOTE — ASSESSMENT & PLAN NOTE
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  Feels this has increased her anxiety, missing work today.  Educated patient that increased use of albuterol can also increase anxiety.    Was prescribed Lexapro in the past, but was experiencing suicidal thoughts so this was discontinued.  Has not tried any additional medications.  - Currently sees a therapist, will continue to do so as she feels this is beneficial.

## 2025-06-03 NOTE — ASSESSMENT & PLAN NOTE
Eczema exacerbated by seasonal allergies, with areas on bilateral upper extremities, and complaints of patchiness near her eyes.  She has been using Benadryl occasionally for this, and colloidal oatmeal lotion with minimal relief.    -Recommend use of Claritin or Zyrtec at bedtime  -Referral placed for allergy  -Eucerin eczema repair cream

## 2025-06-04 ENCOUNTER — APPOINTMENT (OUTPATIENT)
Dept: LAB | Facility: HOSPITAL | Age: 25
End: 2025-06-04
Payer: COMMERCIAL

## 2025-06-04 DIAGNOSIS — Z13.29 SCREENING FOR THYROID DISORDER: ICD-10-CM

## 2025-06-04 DIAGNOSIS — E55.9 AVITAMINOSIS D: ICD-10-CM

## 2025-06-04 DIAGNOSIS — Z13.0 SCREENING FOR IRON DEFICIENCY ANEMIA: ICD-10-CM

## 2025-06-04 DIAGNOSIS — L20.84 INTRINSIC ALLERGIC ECZEMA: ICD-10-CM

## 2025-06-04 LAB
25(OH)D3 SERPL-MCNC: 35.7 NG/ML (ref 30–100)
ALBUMIN SERPL BCG-MCNC: 4.8 G/DL (ref 3.5–5)
ALP SERPL-CCNC: 60 U/L (ref 34–104)
ALT SERPL W P-5'-P-CCNC: 14 U/L (ref 7–52)
ANION GAP SERPL CALCULATED.3IONS-SCNC: 8 MMOL/L (ref 4–13)
AST SERPL W P-5'-P-CCNC: 17 U/L (ref 13–39)
BASOPHILS # BLD AUTO: 0.09 THOUSANDS/ÂΜL (ref 0–0.1)
BASOPHILS NFR BLD AUTO: 1 % (ref 0–1)
BILIRUB SERPL-MCNC: 0.27 MG/DL (ref 0.2–1)
BUN SERPL-MCNC: 7 MG/DL (ref 5–25)
CALCIUM SERPL-MCNC: 9.8 MG/DL (ref 8.4–10.2)
CHLORIDE SERPL-SCNC: 104 MMOL/L (ref 96–108)
CO2 SERPL-SCNC: 28 MMOL/L (ref 21–32)
CREAT SERPL-MCNC: 0.6 MG/DL (ref 0.6–1.3)
EOSINOPHIL # BLD AUTO: 0.53 THOUSAND/ÂΜL (ref 0–0.61)
EOSINOPHIL NFR BLD AUTO: 5 % (ref 0–6)
ERYTHROCYTE [DISTWIDTH] IN BLOOD BY AUTOMATED COUNT: 11.9 % (ref 11.6–15.1)
GFR SERPL CREATININE-BSD FRML MDRD: 127 ML/MIN/1.73SQ M
GLUCOSE SERPL-MCNC: 77 MG/DL (ref 65–140)
HCT VFR BLD AUTO: 41.1 % (ref 34.8–46.1)
HGB BLD-MCNC: 13.7 G/DL (ref 11.5–15.4)
IMM GRANULOCYTES # BLD AUTO: 0.05 THOUSAND/UL (ref 0–0.2)
IMM GRANULOCYTES NFR BLD AUTO: 0 % (ref 0–2)
LYMPHOCYTES # BLD AUTO: 2.72 THOUSANDS/ÂΜL (ref 0.6–4.47)
LYMPHOCYTES NFR BLD AUTO: 23 % (ref 14–44)
MCH RBC QN AUTO: 32 PG (ref 26.8–34.3)
MCHC RBC AUTO-ENTMCNC: 33.3 G/DL (ref 31.4–37.4)
MCV RBC AUTO: 96 FL (ref 82–98)
MONOCYTES # BLD AUTO: 0.94 THOUSAND/ÂΜL (ref 0.17–1.22)
MONOCYTES NFR BLD AUTO: 8 % (ref 4–12)
NEUTROPHILS # BLD AUTO: 7.57 THOUSANDS/ÂΜL (ref 1.85–7.62)
NEUTS SEG NFR BLD AUTO: 63 % (ref 43–75)
NRBC BLD AUTO-RTO: 0 /100 WBCS
PLATELET # BLD AUTO: 348 THOUSANDS/UL (ref 149–390)
PMV BLD AUTO: 10.4 FL (ref 8.9–12.7)
POTASSIUM SERPL-SCNC: 3.8 MMOL/L (ref 3.5–5.3)
PROT SERPL-MCNC: 7.9 G/DL (ref 6.4–8.4)
RBC # BLD AUTO: 4.28 MILLION/UL (ref 3.81–5.12)
SODIUM SERPL-SCNC: 140 MMOL/L (ref 135–147)
TSH SERPL DL<=0.05 MIU/L-ACNC: 3.34 UIU/ML (ref 0.45–4.5)
WBC # BLD AUTO: 11.9 THOUSAND/UL (ref 4.31–10.16)

## 2025-06-04 PROCEDURE — 82306 VITAMIN D 25 HYDROXY: CPT

## 2025-06-04 PROCEDURE — 36415 COLL VENOUS BLD VENIPUNCTURE: CPT

## 2025-06-04 PROCEDURE — 84443 ASSAY THYROID STIM HORMONE: CPT

## 2025-06-04 PROCEDURE — 80053 COMPREHEN METABOLIC PANEL: CPT

## 2025-06-04 PROCEDURE — 85025 COMPLETE CBC W/AUTO DIFF WBC: CPT

## 2025-06-05 ENCOUNTER — TELEPHONE (OUTPATIENT)
Age: 25
End: 2025-06-05

## 2025-06-05 ENCOUNTER — RESULTS FOLLOW-UP (OUTPATIENT)
Dept: FAMILY MEDICINE CLINIC | Facility: HOSPITAL | Age: 25
End: 2025-06-05

## 2025-06-05 NOTE — TELEPHONE ENCOUNTER
Patient is requesting a call back from provider in regards to her white blood cell count being elevated. She believes she has a sinus infection and is requesting an antibiotic be sent to [id number] - [place] - [id number] [facility] [[id number]] . Patient says she can not get out of bed and her asthma is really bad. She would like a work note to excuse her from work tomorrow 6/6/25 as well. Please advise back to patient if needed

## 2025-06-06 ENCOUNTER — PATIENT MESSAGE (OUTPATIENT)
Dept: FAMILY MEDICINE CLINIC | Facility: HOSPITAL | Age: 25
End: 2025-06-06

## 2025-06-06 DIAGNOSIS — J40 BRONCHITIS: Primary | ICD-10-CM

## 2025-06-06 RX ORDER — AZITHROMYCIN 250 MG/1
TABLET, FILM COATED ORAL
Qty: 6 TABLET | Refills: 0 | Status: SHIPPED | OUTPATIENT
Start: 2025-06-06 | End: 2025-06-11

## 2025-06-09 ENCOUNTER — OFFICE VISIT (OUTPATIENT)
Dept: FAMILY MEDICINE CLINIC | Facility: HOSPITAL | Age: 25
End: 2025-06-09
Payer: COMMERCIAL

## 2025-06-09 VITALS
DIASTOLIC BLOOD PRESSURE: 84 MMHG | SYSTOLIC BLOOD PRESSURE: 126 MMHG | OXYGEN SATURATION: 99 % | WEIGHT: 132.8 LBS | HEART RATE: 84 BPM | HEIGHT: 62 IN | BODY MASS INDEX: 24.44 KG/M2

## 2025-06-09 DIAGNOSIS — F41.1 GENERALIZED ANXIETY DISORDER: ICD-10-CM

## 2025-06-09 DIAGNOSIS — H92.01 RIGHT EAR PAIN: Primary | ICD-10-CM

## 2025-06-09 PROCEDURE — 99214 OFFICE O/P EST MOD 30 MIN: CPT | Performed by: NURSE PRACTITIONER

## 2025-06-09 RX ORDER — DULOXETIN HYDROCHLORIDE 20 MG/1
20 CAPSULE, DELAYED RELEASE ORAL DAILY
Qty: 30 CAPSULE | Refills: 0 | Status: SHIPPED | OUTPATIENT
Start: 2025-06-09 | End: 2025-06-12

## 2025-06-09 NOTE — ASSESSMENT & PLAN NOTE
PRIMITIVO-7 Flowsheet Screening      Flowsheet Row Most Recent Value   Over the last two weeks, how often have you been bothered by the following problems?     Feeling nervous, anxious, or on edge 3   Not being able to stop or control worrying 3   Worrying too much about different things 3   Trouble relaxing  3   Being so restless that it's hard to sit still 3   Becoming easily annoyed or irritable  3   Feeling afraid as if something awful might happen 3   How difficult have these problems made it for you to do your work, take care of things at home, or get along with other people?  Extremely difficult   PRIMITIVO Score  21          She relays hx of anxiety since age 13-14   Previously treated with lexapro 2 years ago and was dc'd due to suicidal thoughts  Anxiety has been worse recently impacting her ability to get to work so she is requesting to resume medication  Rx issued for low dose duloxetine daily  Continue therapy regularly as recommended - last saw therapist earlier today  Work note issued as she requests  Return to see PCP in 3-4 weeks    Orders:    DULoxetine (CYMBALTA) 20 mg capsule; Take 1 capsule (20 mg total) by mouth daily

## 2025-06-09 NOTE — PROGRESS NOTES
Name: Estrella Menon      : 2000      MRN: [id number]  Encounter Provider: DARIEL Shanks  Encounter Date: 2025   Encounter department: [facility] (formerly [facility]3] CARE [address]   :  Assessment & Plan  Right ear pain  Reassured pt, her ear exam is normal without sign of OM  Sx's likely due to ETD - advise she use OTC flonase q HS and sudafed product as needed  Complete antibiotic as rx'd for resolving sinusitis       Generalized anxiety disorder    PRIMITIVO-7 Flowsheet Screening      Flowsheet Row Most Recent Value   Over the last two weeks, how often have you been bothered by the following problems?     Feeling nervous, anxious, or on edge 3   Not being able to stop or control worrying 3   Worrying too much about different things 3   Trouble relaxing  3   Being so restless that it's hard to sit still 3   Becoming easily annoyed or irritable  3   Feeling afraid as if something awful might happen 3   How difficult have these problems made it for you to do your work, take care of things at home, or get along with other people?  Extremely difficult   PRIMITIVO Score  21          She relays hx of anxiety since age 13-14   Previously treated with lexapro 2 years ago and was dc'd due to suicidal thoughts  Anxiety has been worse recently impacting her ability to get to work so she is requesting to resume medication  Rx issued for low dose duloxetine daily  Continue therapy regularly as recommended - last saw therapist earlier today  Work note issued as she requests  Return to see PCP in 3-4 weeks    Orders:    DULoxetine (CYMBALTA) 20 mg capsule; Take 1 capsule (20 mg total) by mouth daily         History of Present Illness         Currently on a zpack for sinus infection and is still feeling stuffy. Has 1 more day to complete. Having right ear pain since last night. Denies fever or chills.   Also c/o worse anxiety and wants to resume medication.         Review of Systems   Constitutional:  Negative for fever  "(temp 99.6).   HENT:  Positive for congestion and ear pain (right ear).    Psychiatric/Behavioral:  The patient is nervous/anxious.        Objective   /84   Pulse 84   Ht 5' 2\" (1.575 m)   Wt 60.2 kg (132 lb 12.8 oz)   LMP 05/28/2025   SpO2 99%   BMI 24.29 kg/m²        Physical Exam  Vitals reviewed.   Constitutional:       General: She is not in acute distress.     Appearance: Normal appearance.   HENT:      Head: Normocephalic.      Right Ear: A middle ear effusion is present. Tympanic membrane is not erythematous.      Left Ear: A middle ear effusion is present. Tympanic membrane is not erythematous.      Nose: Nose normal.   Pulmonary:      Effort: Pulmonary effort is normal. No respiratory distress.      Comments: No cough    Musculoskeletal:      Cervical back: Normal range of motion.   Lymphadenopathy:      Cervical: No cervical adenopathy.     Skin:     General: Skin is warm and dry.     Neurological:      General: No focal deficit present.      Mental Status: She is alert and oriented to person, place, and time.     Psychiatric:         Mood and Affect: Mood normal. Mood is not anxious.         Speech: Speech normal.         Behavior: Behavior normal. Behavior is cooperative.         Thought Content: Thought content normal.         Cognition and Memory: Cognition and memory normal.         Judgment: Judgment normal.         Administrative Statements   I have spent a total time of 15 minutes in caring for this patient on the day of the visit/encounter including Risks and benefits of tx options, Instructions for management, Patient and family education, Impressions, Counseling / Coordination of care, Documenting in the medical record, Reviewing/placing orders in the medical record (including tests, medications, and/or procedures), and Obtaining or reviewing history    "

## 2025-06-09 NOTE — TELEPHONE ENCOUNTER
Patient called stating she is having issues still with ear pain and anxiety.  Appointment scheduled for this evening.

## 2025-06-10 DIAGNOSIS — F41.1 GENERALIZED ANXIETY DISORDER: Primary | ICD-10-CM

## 2025-06-10 RX ORDER — CLONAZEPAM 0.25 MG/1
0.25 TABLET, ORALLY DISINTEGRATING ORAL 2 TIMES DAILY
Qty: 30 TABLET | Refills: 0 | Status: SHIPPED | OUTPATIENT
Start: 2025-06-10

## 2025-06-12 ENCOUNTER — ANNUAL EXAM (OUTPATIENT)
Dept: OBGYN CLINIC | Facility: CLINIC | Age: 25
End: 2025-06-12
Payer: COMMERCIAL

## 2025-06-12 VITALS
HEIGHT: 62 IN | SYSTOLIC BLOOD PRESSURE: 116 MMHG | BODY MASS INDEX: 23.92 KG/M2 | DIASTOLIC BLOOD PRESSURE: 62 MMHG | WEIGHT: 130 LBS

## 2025-06-12 DIAGNOSIS — Z01.419 ENCOUNTER FOR GYNECOLOGICAL EXAMINATION WITHOUT ABNORMAL FINDING: Primary | ICD-10-CM

## 2025-06-12 DIAGNOSIS — Z12.4 SCREENING FOR CERVICAL CANCER: ICD-10-CM

## 2025-06-12 DIAGNOSIS — Z11.3 SCREEN FOR STD (SEXUALLY TRANSMITTED DISEASE): ICD-10-CM

## 2025-06-12 DIAGNOSIS — Z30.09 CONTRACEPTIVE EDUCATION: ICD-10-CM

## 2025-06-12 PROCEDURE — 87491 CHLMYD TRACH DNA AMP PROBE: CPT | Performed by: OBSTETRICS & GYNECOLOGY

## 2025-06-12 PROCEDURE — 87591 N.GONORRHOEAE DNA AMP PROB: CPT | Performed by: OBSTETRICS & GYNECOLOGY

## 2025-06-12 PROCEDURE — S0610 ANNUAL GYNECOLOGICAL EXAMINA: HCPCS | Performed by: OBSTETRICS & GYNECOLOGY

## 2025-06-12 PROCEDURE — G0145 SCR C/V CYTO,THINLAYER,RESCR: HCPCS | Performed by: OBSTETRICS & GYNECOLOGY

## 2025-06-12 NOTE — PROGRESS NOTES
[facility] - [address], [address], [place] [address]    ASSESSMENT/PLAN: Estrella Menon is a 24 y.o.  who presents for annual gynecologic exam.    Encounter for routine gynecologic examination  - Routine well woman exam completed today.  - HPV Vaccination status: Immunization series complete  - STI screening offered including HIV testing: ordered   - Contraceptive counseling discussed.  Current contraception: condoms:     Additional problems addressed during this visit:  1. Encounter for gynecological examination without abnormal finding  2. Contraceptive education  3. Screening for cervical cancer  4. Screen for STD (sexually transmitted disease)  -     Hepatitis B surface antigen; Future; Expected date: Collect anytime  -     Hepatitis C antibody; Future; Expected date: Collect anytime  -     HIV 1/2 AB/AG w Reflex [facility] for 2 yr old and above; Future; Expected date: Collect anytime  -     RPR-Syphilis Screening (Total Syphilis IGG/IGM); Future    CC:  Annual Gynecologic Examination    HPI: Estrella Menon is a 24 y.o.  who presents for annual gynecologic examination.  25 yo  here for wellness exam .    10-12   menarche   mild cramps   cycles monthly + condom  use .  Desires sti screening       The following portions of the patient's history were reviewed and updated as appropriate: She  has a past medical history of Allergic rhinitis, Anxiety, and Mild persistent asthma without complication (2019).  She  has a past surgical history that includes [place] tooth extraction.  Her family history includes Cancer in her paternal grandfather.  She  reports that she has never smoked. She has never used smokeless tobacco. She reports that she does not drink alcohol and does not use drugs.  Current Outpatient Medications   Medication Sig Dispense Refill   • albuterol (PROVENTIL HFA,VENTOLIN HFA) 90 mcg/act inhaler Inhale 2 puffs every 6 (six) hours as needed for  "wheezing 18 g 3   • clonazePAM (KlonoPIN) 0.25 MG disintegrating tablet Take 1 tablet (0.25 mg total) by mouth 2 (two) times a day 30 tablet 0   • ferrous sulfate 325 (65 FE) MG EC tablet Take 325 mg by mouth daily with breakfast     • fluticasone (Flovent HFA) 44 mcg/act inhaler Inhale 2 puffs 2 (two) times a day Rinse mouth after use. 10.6 g 3   • MAGNESIUM GLYCINATE PO Take 100 mg by mouth daily at bedtime     • Multiple Vitamin (multivitamin) tablet Take 1 tablet by mouth daily     • Omega-3 Fatty Acids (OMEGA 3 PO) Take 1 tablet by mouth in the morning       No current facility-administered medications for this visit.     She is allergic to lexapro [escitalopram]..    Review of Systems   Constitutional:  Negative for chills and fever.   HENT:  Negative for ear pain and sore throat.    Eyes:  Negative for pain and visual disturbance.   Respiratory:  Negative for cough and shortness of breath.    Cardiovascular:  Negative for chest pain and palpitations.   Gastrointestinal:  Negative for abdominal pain and vomiting.   Endocrine: Negative.    Genitourinary:  Negative for dysuria and hematuria.   Musculoskeletal:  Negative for arthralgias and back pain.   Skin:  Negative for color change and rash.   Allergic/Immunologic: Negative.    Neurological: Negative.  Negative for seizures and syncope.   Psychiatric/Behavioral: Negative.     All other systems reviewed and are negative.        Objective:  /62 (BP Location: Right arm, Patient Position: Sitting, Cuff Size: Standard)   Ht 5' 2\" (1.575 m)   Wt 59 kg (130 lb)   LMP 05/28/2025   BMI 23.78 kg/m²    Physical Exam  Vitals and nursing note reviewed.   Constitutional:       Appearance: Normal appearance.   HENT:      Head: Normocephalic.     Cardiovascular:      Rate and Rhythm: Normal rate and regular rhythm.      Pulses: Normal pulses.      Heart sounds: Normal heart sounds.   Pulmonary:      Effort: Pulmonary effort is normal.      Breath sounds: Normal " breath sounds.   Chest:      Chest wall: No mass, lacerations, swelling, tenderness or edema.   Breasts:     Nilo Score is 4.      Breasts are symmetrical.      Right: Normal. No swelling, bleeding, inverted nipple, mass, nipple discharge, skin change or tenderness.      Left: No swelling, bleeding, inverted nipple, mass, nipple discharge, skin change or tenderness.   Abdominal:      General: Abdomen is flat. Bowel sounds are normal.      Palpations: Abdomen is soft.   Genitourinary:     General: Normal vulva.      Exam position: Lithotomy position.      Pubic Area: No rash.       Nilo stage (genital): 4.      Labia:         Right: No rash, tenderness or lesion.         Left: No rash, tenderness or lesion.       Urethra: No urethral pain, urethral swelling or urethral lesion.      Vagina: Normal.      Cervix: No cervical motion tenderness or discharge.      Uterus: Normal.       Adnexa: Right adnexa normal and left adnexa normal.      Rectum: Normal.     Musculoskeletal:         General: Normal range of motion.      Cervical back: Normal range of motion and neck supple.   Lymphadenopathy:      Upper Body:      Right upper body: No supraclavicular, axillary or pectoral adenopathy.      Left upper body: No supraclavicular, axillary or pectoral adenopathy.      Lower Body: No right inguinal adenopathy. No left inguinal adenopathy.     Skin:     General: Skin is warm and dry.      Comments: Multiple  tattoos      Neurological:      General: No focal deficit present.      Mental Status: She is alert and oriented to person, place, and time.     Psychiatric:         Mood and Affect: Mood normal.         Behavior: Behavior normal.         Thought Content: Thought content normal.         Judgment: Judgment normal.

## 2025-06-16 LAB
C TRACH DNA SPEC QL NAA+PROBE: NEGATIVE
N GONORRHOEA DNA SPEC QL NAA+PROBE: NEGATIVE

## 2025-06-17 DIAGNOSIS — F41.1 GENERALIZED ANXIETY DISORDER: Primary | ICD-10-CM

## 2025-06-18 ENCOUNTER — TELEPHONE (OUTPATIENT)
Age: 25
End: 2025-06-18

## 2025-06-18 ENCOUNTER — RESULTS FOLLOW-UP (OUTPATIENT)
Dept: OBGYN CLINIC | Facility: CLINIC | Age: 25
End: 2025-06-18

## 2025-06-18 LAB
LAB AP GYN PRIMARY INTERPRETATION: NORMAL
Lab: NORMAL
PATH INTERP SPEC-IMP: NORMAL

## 2025-06-18 NOTE — TELEPHONE ENCOUNTER
Patient is calling to report that she does not feel comfortable taking the clonazePAM (KlonoPIN) 0.25 MG disintegrating tablet because of the side effects, she does not want to have to take something everyday because she states she is not having panic attacks every day    Patient has not picked up the medication and has not started taking it    Please review and advise

## 2025-06-18 NOTE — TELEPHONE ENCOUNTER
Patient calling to see if she needed paper lab scripts for STD testing or if she should just show up to  [facility].    Chart reviewed, orders for STD labs in chart.  Advised patient can go to [facility] and they will have the orders on file for her there.

## 2025-07-12 PROBLEM — Z12.4 SCREENING FOR CERVICAL CANCER: Status: RESOLVED | Noted: 2025-06-12 | Resolved: 2025-07-12

## 2025-07-12 PROBLEM — Z11.3 SCREEN FOR STD (SEXUALLY TRANSMITTED DISEASE): Status: RESOLVED | Noted: 2025-06-12 | Resolved: 2025-07-12

## 2025-07-12 PROBLEM — Z01.419 ENCOUNTER FOR GYNECOLOGICAL EXAMINATION WITHOUT ABNORMAL FINDING: Status: RESOLVED | Noted: 2025-06-12 | Resolved: 2025-07-12

## 2025-07-26 ENCOUNTER — NURSE TRIAGE (OUTPATIENT)
Dept: OTHER | Facility: OTHER | Age: 25
End: 2025-07-26

## 2025-07-26 ENCOUNTER — OFFICE VISIT (OUTPATIENT)
Dept: URGENT CARE | Facility: CLINIC | Age: 25
End: 2025-07-26
Payer: COMMERCIAL

## 2025-07-26 VITALS
BODY MASS INDEX: 23.92 KG/M2 | DIASTOLIC BLOOD PRESSURE: 70 MMHG | HEART RATE: 75 BPM | RESPIRATION RATE: 18 BRPM | WEIGHT: 130 LBS | OXYGEN SATURATION: 99 % | HEIGHT: 62 IN | TEMPERATURE: 99.1 F | SYSTOLIC BLOOD PRESSURE: 112 MMHG

## 2025-07-26 DIAGNOSIS — J45.21 MILD INTERMITTENT ASTHMA WITH (ACUTE) EXACERBATION: Primary | ICD-10-CM

## 2025-07-26 PROCEDURE — 99213 OFFICE O/P EST LOW 20 MIN: CPT | Performed by: FAMILY MEDICINE

## 2025-07-26 RX ORDER — FLUTICASONE PROPIONATE 110 UG/1
2 AEROSOL, METERED RESPIRATORY (INHALATION) 2 TIMES DAILY
Qty: 12 G | Refills: 0 | Status: SHIPPED | OUTPATIENT
Start: 2025-07-26 | End: 2025-07-26

## 2025-07-26 RX ORDER — FLUTICASONE PROPIONATE 110 UG/1
AEROSOL, METERED RESPIRATORY (INHALATION)
Qty: 12 G | Refills: 0 | Status: SHIPPED | OUTPATIENT
Start: 2025-07-26

## 2025-07-26 NOTE — TELEPHONE ENCOUNTER
"Regarding: Flovent 110  ----- Message from Lyla GONZALEZ sent at 7/26/2025 11:27 AM EDT -----  \" I just noticed that the doctor has been sending the wrong dose for her asthma pump. She is on flovent 110 and the doctor sent flovent 44. She has been having a lot of asthma attacks. Can an rx for flovent 110 be sent to the pharmacy?\"    "

## 2025-07-26 NOTE — PATIENT INSTRUCTIONS
"Patient Education     Asthma in adults   The Basics   Written by the doctors and editors at [facility]   What is asthma? -- Asthma is a condition that can make it hard to breathe. Asthma symptoms can be mild or severe. They can come and go. An asthma \"attack\" is when symptoms start suddenly. This happens when the airways in the lungs become more narrow and inflamed (figure 1).  Asthma can run in families.  What are the symptoms of asthma? -- Asthma symptoms can include:   Wheezing or noisy breathing   Coughing   Tight feeling in the chest   Shortness of breath  Symptoms can happen each day, each week, or less often. Symptoms can range from mild to severe. Although it is rare, an asthma attack can sometimes even lead to death.  Is there a test for asthma? -- Yes. Your doctor will ask you about your symptoms and have you do a breathing test to check how your lungs are working.  If your doctor thinks that allergies might be making your asthma worse, they might suggest allergy testing. This can include skin tests or blood tests.  How is asthma treated? -- Asthma is treated with different types of medicines. The medicines can be inhalers, liquids, or pills. Your doctor will prescribe medicine based on how often you have symptoms and how serious your symptoms are. There are 2 main types of asthma medicines:   Quick-relief medicines stop symptoms quickly, in 5 to 15 minutes. Almost everyone with asthma carries a quick-relief inhaler with them. People use these medicines whenever they have asthma symptoms. Most people need these medicines 1 or 2 times a week, or less often. But when asthma symptoms get worse, more doses might be needed.   Long-term controller medicines control asthma and help prevent future attacks. People who get asthma symptoms more than 2 times a week should use a controller medicine every day.  Some medicines can work as both a controller medicine and a quick-relief medicine. These are taken once or twice " "a day as controller medicines. They can also be used for quick relief.  It is very important to take all of the medicines the doctor prescribes, exactly how you are supposed to take them. You might have to take medicines a few times a day. Your doctor, nurse, or pharmacist will show you the right way to use your inhaler(s).  If your symptoms get much worse all of a sudden, use your quick-relief medicine and contact your doctor or nurse. You might need to go to the hospital for treatment.  What is an asthma action plan? -- An asthma action plan is a list of instructions that tell you (form 1):   Which medicines to use each day at home   Which medicines to take if your symptoms get worse   When to get help or call for an ambulance  If you have frequent or severe asthma symptoms, your doctor might suggest that you have an asthma action plan. If so, you and your doctor will work together to make one. As part of your action plan, you might need to use something called a \"peak flow meter.\" Breathing into this device will show how your lungs are working. Your doctor will show you the right way to use your peak flow meter.  Can asthma symptoms be prevented? -- There are things that you can do to help prevent asthma attacks. Your doctor or nurse can talk to you about what is most important for you.  In general, you can:   Avoid \"triggers\" - These are things that make your symptoms worse. Common triggers include smoke, air pollution, dust, mold, pollen, strong chemicals or smells, and very cold or dry air. For some people, being around certain animals can trigger symptoms. Exercise and stress can also be triggers.  Some adults with asthma have worse symptoms if they take aspirin or medicines called NSAIDs. NSAIDs include ibuprofen (sample brand names: Advil, Motrin) and naproxen (sample brand names: Aleve, Naprosyn). Ask your doctor if you need to avoid these medicines.  If you can't avoid certain triggers, talk with your " "doctor about what you can do. For example, you might need to take an extra dose of your quick-relief inhaler medicine before you exercise or are around things you are allergic to.   Lower your risk of getting sick - Some infections can make asthma symptoms worse. These include the common cold, the flu, and coronavirus disease 2019 (\"COVID-19\").  It's important to get the COVID-19 vaccine. This will lower the risk of severe illness if you do get COVID-19. You should also get a flu shot every year. Plus, some people need to get a vaccine to help prevent pneumonia.  If you think that you might have an infection, tell your doctor or nurse. They can help you figure out if you need treatment.   Make sure that you know how and when to take your medicines - If you take controller medicines, follow all instructions to help prevent symptoms. You should also make sure that you know how and when to use your quick-relief medicine.   See your doctor or nurse regularly - If you need asthma medicine every day, you should see your doctor or nurse at least every 6 months. At these appointments, they will ask about your symptoms, check how well your lungs are working, and talk about your treatment plan.  What if I want to get pregnant? -- If you want to get pregnant, talk to your doctor about how to control your asthma. Keeping your asthma well controlled is important for the health of your baby. Most asthma medicines are safe to take if you are pregnant.  When should I call the doctor? -- Call for an ambulance (in the [place] and [place], call 9-1-1) if you have severe symptoms like:   You have so much trouble breathing that you cannot talk.   Your lips or fingernails turn gray or blue.  Call your doctor or nurse if:   You have an asthma attack and the symptoms do not improve, or get worse, after using a quick-relief medicine.   You need to use your quick-relief medicine more than 2 times a week.   You cannot do your normal activities " because of your asthma symptoms.   You have any questions about your medicines.  All topics are updated as new evidence becomes available and our peer review process is complete.  This topic retrieved from [employer] on: Mar 29, 2024.  Topic 43809 Version 24.0  Release: 32.2.4 - C32.87  © 2024 UpToDate, Inc. and/or its affiliates. All rights reserved.  figure 1: Child with asthma     During an asthma attack or flare-up, the muscles around the airways tighten (constrict), and the lining of the airways gets inflamed. Then, mucus builds up. All of this makes it hard to breathe.  Graphic 12276 Version 10.0  form 1: Asthma action plan (adult)     Graphic 455036 Version 1.0  Consumer Information Use and Disclaimer   Disclaimer: This generalized information is a limited summary of diagnosis, treatment, and/or medication information. It is not meant to be comprehensive and should be used as a tool to help the user understand and/or assess potential diagnostic and treatment options. It does NOT include all information about conditions, treatments, medications, side effects, or risks that may apply to a specific patient. It is not intended to be medical advice or a substitute for the medical advice, diagnosis, or treatment of a health care provider based on the health care provider's examination and assessment of a patient's specific and unique circumstances. Patients must speak with a health care provider for complete information about their health, medical questions, and treatment options, including any risks or benefits regarding use of medications. This information does not endorse any treatments or medications as safe, effective, or approved for treating a specific patient. UpToDate, Inc. and its affiliates disclaim any warranty or liability relating to this information or the use thereof.The use of this information is governed by the Terms of Use, available at  https://www.woltersPieholeuwer.com/en/know/clinical-effectiveness-terms. 2024© [employer] and its affiliates and/or licensors. All rights reserved.  Copyright   © 2024 [employer] and/or its affiliates. All rights reserved.

## 2025-07-26 NOTE — LETTER
July 26, 2025     Patient: Estrella Menon  YOB: 2000  Date of Visit: 7/26/2025      To Whom it May Concern:    Estrella Menon was seen in my clinic on 7/26/2025. She may return to work on 7/30/2025.  Please excuse her from work for the following days: 7/25/2025 and 7/26/2025.  If you have any questions or concerns, please don't hesitate to call.    Sincerely,        Nani Gaming MD        CC: No Recipients

## 2025-07-26 NOTE — PROGRESS NOTES
[facility]  Name: Estrella Menon      : 2000      MRN: [id number]  Encounter Provider: Nani Gaming MD  Encounter Date: 2025   Encounter department: [facility]  :  Assessment & Plan  Mild intermittent asthma with (acute) exacerbation  Continue Albuterol as needed  Will increase dose of Flovent from 44 mcg to 110 mcg  To follow up with her PCP for recheck of Asthma symptoms  Orders:  •  fluticasone (Flovent HFA) 110 MCG/ACT inhaler; Rinse mouth after use.        Patient Instructions  Follow up with PCP in 3-5 days.  Proceed to  ER if symptoms worsen.    If tests are performed, our office will contact you with results only if changes need to made to the care plan discussed with you at the visit. [facility]    Chief Complaint:   Chief Complaint   Patient presents with   • Shortness of Breath     Pt reports sob with onset Thursday. Hx Asthma. Managing symptoms with Albuterol and Flovent inhaler without relief. C/o progression of symptoms yesterday with non-productive cough, sinus congestion and possible expiatory wheeze.      History of Present Illness     Estrella Menon is a 24 y.o. female  has a past medical history of Allergic rhinitis, Anxiety, and Mild persistent asthma without complication. who [facility] today accompanied by her mother.  Pt states that her sx started 3 days ago and has been having shortness of breath and coughing with chest congestion.  She was soughin so much that developed voice hoarseness. It sc are wose at night with wheezing.  H/o Asthma  Currently using Flovent 44 mcg twice daily and Albuterol inhaler every other day    Shortness of Breath  Associated symptoms include coughing and wheezing. Pertinent negatives include no chest pain, dizziness, rhinorrhea or sore throat.     History obtained from: patient and patient's parent    Review of Systems   Constitutional:   "Negative for chills and fever.   HENT:  Positive for congestion. Negative for rhinorrhea and sore throat.    Respiratory:  Positive for cough, chest tightness, shortness of breath and wheezing.    Cardiovascular:  Negative for chest pain.   Gastrointestinal:  Negative for diarrhea, nausea and vomiting.   Skin:  Negative for rash.   Neurological:  Negative for dizziness and headaches.     Past Medical History   Past Medical History[1]  Past Surgical History[1]  Family History[1]  she reports that she has never smoked. She has never used smokeless tobacco. She reports that she does not drink alcohol and does not use drugs.  Current Outpatient Medications   Medication Instructions   • albuterol (PROVENTIL HFA,VENTOLIN HFA) 90 mcg/act inhaler 2 puffs, Inhalation, Every 6 hours PRN   • clonazePAM (KLONOPIN) 0.25 mg, Oral, 2 times daily   • ferrous sulfate 325 mg, Daily with breakfast   • fluticasone (Flovent HFA) 110 MCG/ACT inhaler Rinse mouth after use.   • MAGNESIUM GLYCINATE  mg, Daily at bedtime   • Multiple Vitamin (multivitamin) tablet 1 tablet, Daily   • Omega-3 Fatty Acids (OMEGA 3 PO) 1 tablet, Daily   Allergies[1]     Objective   /70 (BP Location: Left arm, Patient Position: Sitting, Cuff Size: Standard)   Pulse 75   Temp 99.1 °F (37.3 °C) (Tympanic)   Resp 18   Ht 5' 2\" (1.575 m)   Wt 59 kg (130 lb)   SpO2 99%   BMI 23.78 kg/m²      Physical Exam  Vitals and nursing note reviewed.   Constitutional:       General: She is not in acute distress.     Appearance: She is well-developed.   HENT:      Head: Normocephalic and atraumatic.      Right Ear: External ear normal.      Left Ear: External ear normal.      Nose: Nose normal.     Eyes:      Conjunctiva/sclera: Conjunctivae normal.       Cardiovascular:      Rate and Rhythm: Normal rate and regular rhythm.      Heart sounds: No murmur heard.  Pulmonary:      Effort: Pulmonary effort is normal. Prolonged expiration present. No respiratory " "distress.      Breath sounds: Normal breath sounds.   Abdominal:      Palpations: Abdomen is soft.      Tenderness: There is no abdominal tenderness.     Musculoskeletal:         General: No swelling.      Cervical back: Neck supple.     Skin:     General: Skin is warm and dry.      Capillary Refill: Capillary refill takes less than 2 seconds.      Findings: No rash.     Neurological:      Mental Status: She is alert and oriented to person, place, and time.     Psychiatric:         Mood and Affect: Mood normal.         Behavior: Behavior normal.         Portions of the record may have been created with voice recognition software.  Occasional wrong word or \"sound a like\" substitutions may have occurred due to the inherent limitations of voice recognition software.  Read the chart carefully and recognize, using context, where substitutions have occurred.       [1]  Past Medical History:  Diagnosis Date   • Allergic rhinitis    • Anxiety    • Mild persistent asthma without complication 09/23/2019   [1]  Past Surgical History:  Procedure Laterality Date   • WISDOM TOOTH EXTRACTION     [1]  Family History  Problem Relation Name Age of Onset   • Cancer Paternal Grandfather Ray Dads dad    • Breast cancer Neg Hx     • Uterine cancer Neg Hx     • Ovarian cancer Neg Hx     • Colon cancer Neg Hx     [1]  Allergies  Allergen Reactions   • Lexapro [Escitalopram] Other (See Comments)     Suicidal ideations     "

## 2025-07-26 NOTE — ASSESSMENT & PLAN NOTE
Continue Albuterol as needed  Will increase dose of Flovent from 44 mcg to 110 mcg  To follow up with her PCP for recheck of Asthma symptoms  Orders:  •  fluticasone (Flovent HFA) 110 MCG/ACT inhaler; Rinse mouth after use.

## 2025-07-26 NOTE — LETTER
July 26, 2025     Patient: Estrella Menon   YOB: 2000   Date of Visit: 7/26/2025       To Whom it May Concern:    Estrella Menon was seen in my clinic on 7/26/2025. She may return to work on 7/27/2025.  Please excuse her from work for the following days: 7/25/2025 and 7/26/2025.  If you have any questions or concerns, please don't hesitate to call.         Sincerely,          Nani Gaming MD        CC: No Recipients

## 2025-07-26 NOTE — TELEPHONE ENCOUNTER
"REASON FOR CONVERSATION: Asthma    SYMPTOMS: Increase in asthma symptoms since Thursday, interfering with ability to sleep and attend work    OTHER HEALTH INFORMATION: Hx of asthma    PROTOCOL DISPOSITION: See PCP Within 24 Hours    CARE ADVICE PROVIDED: Recommended urgent care evaluation; agreeable to be seen today    PRACTICE FOLLOW-UP: N/A          Reason for Disposition   [1] MILD asthma attack (e.g., no SOB at rest, mild SOB with walking, speaks normally in sentences, mild wheezing) AND [2] lasting > 24 hours on prescribed treatment    Answer Assessment - Initial Assessment Questions  1. RESPIRATORY STATUS: \"Describe your breathing?\" (e.g., wheezing, shortness of breath, unable to speak, severe coughing)         \"Can't breathe\" \"very uncomfortable\"  Can't work and hard to talk  Starting to lose voice    2. ONSET: \"When did this asthma attack begin?\"         Recurrent for past week  Mostly Thursday night that it got worse      3. SEVERITY: \"How bad is this attack?\"         As in #1    4. ASTHMA MEDICINES:  \"What treatments have you tried?\"         Flovent  - states this has been 110 not 44 in past but unable to find record of that dosing in chart    albuterol    5. OTHER SYMPTOMS: \"Do you have any other symptoms?\" (e.g., chest pain, coughing up yellow sputum, fever, runny nose)      As in #1    Protocols used: Asthma Attack-Adult-AH    "